# Patient Record
Sex: FEMALE | Race: WHITE | URBAN - METROPOLITAN AREA
[De-identification: names, ages, dates, MRNs, and addresses within clinical notes are randomized per-mention and may not be internally consistent; named-entity substitution may affect disease eponyms.]

---

## 2017-07-14 ENCOUNTER — OFFICE VISIT (OUTPATIENT)
Dept: FAMILY MEDICINE | Facility: CLINIC | Age: 22
End: 2017-07-14
Payer: COMMERCIAL

## 2017-07-14 VITALS
SYSTOLIC BLOOD PRESSURE: 124 MMHG | WEIGHT: 136 LBS | BODY MASS INDEX: 22.66 KG/M2 | DIASTOLIC BLOOD PRESSURE: 74 MMHG | HEIGHT: 65 IN | HEART RATE: 80 BPM

## 2017-07-14 DIAGNOSIS — Z11.3 ROUTINE SCREENING FOR STI (SEXUALLY TRANSMITTED INFECTION): ICD-10-CM

## 2017-07-14 DIAGNOSIS — B36.0 TINEA VERSICOLOR: Primary | ICD-10-CM

## 2017-07-14 PROCEDURE — 87491 CHLMYD TRACH DNA AMP PROBE: CPT | Performed by: NURSE PRACTITIONER

## 2017-07-14 PROCEDURE — 99213 OFFICE O/P EST LOW 20 MIN: CPT | Performed by: NURSE PRACTITIONER

## 2017-07-14 RX ORDER — KETOCONAZOLE 20 MG/ML
SHAMPOO TOPICAL
Qty: 120 ML | Refills: 1 | Status: SHIPPED | OUTPATIENT
Start: 2017-07-14 | End: 2018-03-05

## 2017-07-14 NOTE — MR AVS SNAPSHOT
After Visit Summary   7/14/2017    Romeo Funez    MRN: [id number]           Patient Information     Date Of Birth          1995        Visit Information        Provider Department      7/14/2017 8:40 AM Linh Knowles APRN [facility]        Today's Diagnoses     Routine screening for STI (sexually transmitted infection)    -  1    Tinea versicolor          Care Instructions      Nizoral shampoo sent to the pharmacy-apply and wash off after 5 minutes  Follow up if symptoms do not improve or worsen.    Tinea Versicolor  This is a rash caused by a fungus in the top layers of the skin. This fungus is normally present in the pores of the skin and causes no symptoms. But when the fungus overgrows it causes a rash. The fungus grows more easily in hot climates, and on oily or sweaty skin. Health experts don t know why some people get this rash and others don t. Experts also don t know why the rash will suddenly appear in someone who has never had it before.  The rash is made up of irregular pale or tan spots and patches. The rash is usually on the neck, upper back, chest, and shoulders. You may have mild itching, especially if you become overheated. But it doesn't cause other symptoms. Because these spots don't change color with sun exposure like normal skin, the rash may be lighter or darker than your normal skin.  This rash is harmless and usually causes no symptoms. The only reason for treatment is to improve appearance. Follow the advice below to clear the rash. It might take several months for normal skin color to return.  Home care    Use a medicated dandruff shampoo over your whole body while in the shower. Don t use soap. Let the shampoo stay on for at least a few minutes before rinsing off. Do this every day for 4 weeks.    As a different treatment, you may buy an antifungal cream (miconazole or clotrimazole, both available without a prescription). Use this  2 times a day for 7 days.     This rash is not contagious to others. It can t be spread if someone touches it. So you don t have to worry about exposing others at school, , or work.  Prevention  This fungus can come back again (recur) after treatment. To prevent return of the rash, use medicated dandruff shampoo over your whole body when in the shower. Do this once a month for the next year. This is very important to do in the summertime. That is when the rash is most likely to recur.  Other prevention tips include:    Avoid oily skin products    Wear loose clothing. Try to let your skin stay cool and breathe.    Use sunscreen and protect yourself from sunlight    Avoid tanning beds  Follow-up care  Follow up with your healthcare provider, or as advised. Call your provider if the rash doesn t get better with the above treatment, or if new symptoms appear.  When to seek medical advice  Call your healthcare provider right away if any of these occur:    Increasing redness of the rash    Change in appearance of the rash    Fever of 100.4 F (38 C) or higher, or as directed by your provider  Date Last Reviewed: 8/1/2016 2000-2017 [employer]. [address] [place] [address]. All rights reserved. This information is not intended as a substitute for professional medical care. Always follow your healthcare professional's instructions.                Follow-ups after your visit        Who to contact     If you have questions or need follow up information about today's clinic visit or your schedule please contact [facility] directly at [phone].  Normal or non-critical lab and imaging results will be communicated to you by MyChart, letter or phone within 4 business days after the clinic has received the results. If you do not hear from us within 7 days, please contact the clinic through MyChart or phone. If you have a critical or abnormal lab result, we will notify you  "by phone as soon as possible.  Submit refill requests through [employer] or call your pharmacy and they will forward the refill request to us. Please allow 3 business days for your refill to be completed.          Additional Information About Your Visit        [email or web address] Information     [employer] gives you secure access to your electronic health record. If you see a primary care provider, you can also send messages to your care team and make appointments. If you have questions, please call your primary care clinic.  If you do not have a primary care provider, please call [phone] and they will assist you.        Care EveryWhere ID     This is your Care EveryWhere ID. This could be used by other organizations to access your [facility] medical records  [id number]        Your Vitals Were     Pulse Height BMI (Body Mass Index)             80 5' 4.75\" (1.645 m) 22.81 kg/m2          Blood Pressure from Last 3 Encounters:   07/14/17 124/74   05/26/16 128/75   12/25/15 141/77    Weight from Last 3 Encounters:   07/14/17 136 lb (61.7 kg)   05/26/16 151 lb (68.5 kg)   12/25/15 135 lb (61.2 kg)              We Performed the Following     Chlamydia trachomatis PCR          Today's Medication Changes          These changes are accurate as of: 7/14/17  9:08 AM.  If you have any questions, ask your nurse or doctor.               Start taking these medicines.        Dose/Directions    ketoconazole 2 % shampoo   Commonly known as:  NIZORAL   Used for:  Tinea versicolor   Started by:  Linh Knowles APRN CNP        Apply to the affected area and wash off after 5 minutes.   Quantity:  120 mL   Refills:  1            Where to get your medicines      These medications were sent to [facility] [address] [place] [address]     Phone:  [phone]     ketoconazole 2 % shampoo                Primary Care Provider Office Phone # Fax #    Deepthi Coyne MD " [phone]       [facility] [address] [facility] [place] [address]        Equal Access to Services     RAYRAY JOHNSON : Hadii aad ku hadnylabrittney Raphael, lissahermes erazoblessingha, soto baileykobe ferrer, aletha davis laelizalobito felder. So [facility] [phone].    ATENCIÓN: Si habla español, tiene a rodgers disposición servicios gratuitos de asistencia lingüística. Llame al [phone].    We comply with applicable federal civil rights laws and [place] laws. We do not discriminate on the basis of race, color, national origin, age, disability sex, sexual orientation or gender identity.            Thank you!     Thank you for choosing [facility]  for your care. Our goal is always to provide you with excellent care. Hearing back from our patients is one way we can continue to improve our services. Please take a few minutes to complete the written survey that you may receive in the mail after your visit with us. Thank you!             Your Updated Medication List - Protect others around you: Learn how to safely use, store and throw away your medicines at www.disposemymeds.org.          This list is accurate as of: 7/14/17  9:08 AM.  Always use your most recent med list.                   Brand Name Dispense Instructions for use Diagnosis    ketoconazole 2 % shampoo    NIZORAL    120 mL    Apply to the affected area and wash off after 5 minutes.    Tinea versicolor

## 2017-07-14 NOTE — NURSING NOTE
"Chief Complaint   Patient presents with     Derm Problem       Initial /74 (Cuff Size: Adult Regular)  Pulse 80  Ht 5' 4.75\" (1.645 m)  Wt 136 lb (61.7 kg)  BMI 22.81 kg/m2 Estimated body mass index is 22.81 kg/(m^2) as calculated from the following:    Height as of this encounter: 5' 4.75\" (1.645 m).    Weight as of this encounter: 136 lb (61.7 kg).  Medication Reconciliation: complete    Health Maintenance that is potentially due pending provider review:  Chlamydia screening     Kori Olmos, CMA        "

## 2017-07-14 NOTE — PROGRESS NOTES
SUBJECTIVE:                                                    Romeo Funez is a 21 year old female who presents to clinic today for the following health issues:      Rash  Onset: last year     Description:   Location: neck and back   Character: red  Itching (Pruritis): YES    Progression of Symptoms:  Same - not improving     Accompanying Signs & Symptoms:  Fever: no   Body aches or joint pain: no   Sore throat symptoms: no   Recent cold symptoms: no     History:   Previous similar rash: no     Precipitating factors:   Exposure to similar rash: no   New exposures: Cats   Recent travel: no     Alleviating factors:  none    Therapies Tried and outcome: using antifungal cream - helps with itch    Antifungal cream helping  Gets areas on check, upper back, neck and abdomen.  Noticed last summer.  Itching brown spots.    Problem list and histories reviewed & adjusted, as indicated.  Additional history: as documented    There is no problem list on file for this patient.    Past Surgical History:   Procedure Laterality Date     SURGICAL HISTORY OF -       tonsils and adenoids     SURGICAL HISTORY OF -       PE tubes       Social History   Substance Use Topics     Smoking status: Never Smoker     Smokeless tobacco: Never Used     Alcohol use No     Family History   Problem Relation Age of Onset     DIABETES Paternal Grandmother      HEART DISEASE Paternal Grandmother      CHF     Lipids Paternal Grandmother      HEART DISEASE Paternal Grandfather      CANCER Maternal Grandmother      skin     Asthma Maternal Grandmother          Current Outpatient Prescriptions   Medication Sig Dispense Refill     ketoconazole (NIZORAL) 2 % shampoo Apply to the affected area and wash off after 5 minutes. 120 mL 1     Allergies   Allergen Reactions     Sulfa Drugs Hives            Labs reviewed in EPIC    Reviewed and updated as needed this visit by clinical staff       Reviewed and updated as needed this visit by Provider      "    ROS:  Constitutional, HEENT, cardiovascular, pulmonary, gi and gu systems are negative, except as otherwise noted.    OBJECTIVE:     /74 (Cuff Size: Adult Regular)  Pulse 80  Ht 5' 4.75\" (1.645 m)  Wt 136 lb (61.7 kg)  BMI 22.81 kg/m2  Body mass index is 22.81 kg/(m^2).  GENERAL: healthy, alert and no distress  SKIN: hyperpigmentation - right neck and abdomen  PSYCH: mentation appears normal, affect normal/bright    Diagnostic Test Results:  none     ASSESSMENT/PLAN:     1. Tinea versicolor  Consistent with tinea versicolor.  Nizoral shampoo sent to the pharmacy. Symptomatic care and follow up discussed.  - ketoconazole (NIZORAL) 2 % shampoo; Apply to the affected area and wash off after 5 minutes.  Dispense: 120 mL; Refill: 1    2. Routine screening for STI (sexually transmitted infection)    - Chlamydia trachomatis PCR    Home care instructions were reviewed with the patient. The risks, benefits and treatment options of prescribed medications or other treatments have been discussed with the patient. The patient verbalized their understanding and should call or follow up if no improvement or if they develop further problems.      Patient Instructions     Nizoral shampoo sent to the pharmacy-apply and wash off after 5 minutes  Follow up if symptoms do not improve or worsen.    Tinea Versicolor  This is a rash caused by a fungus in the top layers of the skin. This fungus is normally present in the pores of the skin and causes no symptoms. But when the fungus overgrows it causes a rash. The fungus grows more easily in hot climates, and on oily or sweaty skin. Health experts don t know why some people get this rash and others don t. Experts also don t know why the rash will suddenly appear in someone who has never had it before.  The rash is made up of irregular pale or tan spots and patches. The rash is usually on the neck, upper back, chest, and shoulders. You may have mild itching, especially if you " become overheated. But it doesn't cause other symptoms. Because these spots don't change color with sun exposure like normal skin, the rash may be lighter or darker than your normal skin.  This rash is harmless and usually causes no symptoms. The only reason for treatment is to improve appearance. Follow the advice below to clear the rash. It might take several months for normal skin color to return.  Home care    Use a medicated dandruff shampoo over your whole body while in the shower. Don t use soap. Let the shampoo stay on for at least a few minutes before rinsing off. Do this every day for 4 weeks.    As a different treatment, you may buy an antifungal cream (miconazole or clotrimazole, both available without a prescription). Use this 2 times a day for 7 days.     This rash is not contagious to others. It can t be spread if someone touches it. So you don t have to worry about exposing others at school, , or work.  Prevention  This fungus can come back again (recur) after treatment. To prevent return of the rash, use medicated dandruff shampoo over your whole body when in the shower. Do this once a month for the next year. This is very important to do in the summertime. That is when the rash is most likely to recur.  Other prevention tips include:    Avoid oily skin products    Wear loose clothing. Try to let your skin stay cool and breathe.    Use sunscreen and protect yourself from sunlight    Avoid tanning beds  Follow-up care  Follow up with your healthcare provider, or as advised. Call your provider if the rash doesn t get better with the above treatment, or if new symptoms appear.  When to seek medical advice  Call your healthcare provider right away if any of these occur:    Increasing redness of the rash    Change in appearance of the rash    Fever of 100.4 F (38 C) or higher, or as directed by your provider  Date Last Reviewed: 8/1/2016 2000-2017 [employer]. [address] [place] [address]  Road, [place] [address]. All rights reserved. This information is not intended as a substitute for professional medical care. Always follow your healthcare professional's instructions.            LORI [facility]

## 2017-07-14 NOTE — PATIENT INSTRUCTIONS
Nizoral shampoo sent to the pharmacy-apply and wash off after 5 minutes  Follow up if symptoms do not improve or worsen.    Tinea Versicolor  This is a rash caused by a fungus in the top layers of the skin. This fungus is normally present in the pores of the skin and causes no symptoms. But when the fungus overgrows it causes a rash. The fungus grows more easily in hot climates, and on oily or sweaty skin. Health experts don t know why some people get this rash and others don t. Experts also don t know why the rash will suddenly appear in someone who has never had it before.  The rash is made up of irregular pale or tan spots and patches. The rash is usually on the neck, upper back, chest, and shoulders. You may have mild itching, especially if you become overheated. But it doesn't cause other symptoms. Because these spots don't change color with sun exposure like normal skin, the rash may be lighter or darker than your normal skin.  This rash is harmless and usually causes no symptoms. The only reason for treatment is to improve appearance. Follow the advice below to clear the rash. It might take several months for normal skin color to return.  Home care    Use a medicated dandruff shampoo over your whole body while in the shower. Don t use soap. Let the shampoo stay on for at least a few minutes before rinsing off. Do this every day for 4 weeks.    As a different treatment, you may buy an antifungal cream (miconazole or clotrimazole, both available without a prescription). Use this 2 times a day for 7 days.     This rash is not contagious to others. It can t be spread if someone touches it. So you don t have to worry about exposing others at school, , or work.  Prevention  This fungus can come back again (recur) after treatment. To prevent return of the rash, use medicated dandruff shampoo over your whole body when in the shower. Do this once a month for the next year. This is very important to do in the  summertime. That is when the rash is most likely to recur.  Other prevention tips include:    Avoid oily skin products    Wear loose clothing. Try to let your skin stay cool and breathe.    Use sunscreen and protect yourself from sunlight    Avoid tanning beds  Follow-up care  Follow up with your healthcare provider, or as advised. Call your provider if the rash doesn t get better with the above treatment, or if new symptoms appear.  When to seek medical advice  Call your healthcare provider right away if any of these occur:    Increasing redness of the rash    Change in appearance of the rash    Fever of 100.4 F (38 C) or higher, or as directed by your provider  Date Last Reviewed: 8/1/2016 2000-2017 [employer]. [address] [place] [address], [place] [address]. All rights reserved. This information is not intended as a substitute for professional medical care. Always follow your healthcare professional's instructions.

## 2017-07-16 LAB
C TRACH DNA SPEC QL NAA+PROBE: NORMAL
SPECIMEN SOURCE: NORMAL

## 2017-11-24 ENCOUNTER — PRENATAL OFFICE VISIT - GICH (OUTPATIENT)
Dept: OBGYN | Facility: OTHER | Age: 22
End: 2017-11-24

## 2017-11-24 ENCOUNTER — HISTORY (OUTPATIENT)
Dept: OBGYN | Facility: OTHER | Age: 22
End: 2017-11-24

## 2017-11-24 DIAGNOSIS — Z34.01 ENCOUNTER FOR SUPERVISION OF NORMAL FIRST PREGNANCY IN FIRST TRIMESTER: ICD-10-CM

## 2017-11-24 LAB
ABORH - HISTORICAL: NORMAL
ABSOLUTE BASOPHILS - HISTORICAL: 0.1 THOU/CU MM
ABSOLUTE EOSINOPHILS - HISTORICAL: 0.1 THOU/CU MM
ABSOLUTE IMMATURE GRANULOCYTES(METAS,MYELOS,PROS) - HISTORICAL: 0 THOU/CU MM
ABSOLUTE LYMPHOCYTES - HISTORICAL: 1.7 THOU/CU MM (ref 0.9–2.9)
ABSOLUTE MONOCYTES - HISTORICAL: 0.4 THOU/CU MM
ABSOLUTE NEUTROPHILS - HISTORICAL: 6.3 THOU/CU MM (ref 1.7–7)
ANTIBODY SCREEN - HISTORICAL: NEGATIVE
BASOPHILS # BLD AUTO: 0.7 %
EOSINOPHIL NFR BLD AUTO: 0.7 %
ERYTHROCYTE [DISTWIDTH] IN BLOOD BY AUTOMATED COUNT: 11.9 % (ref 11.5–15.5)
HBSAG CATEGORY - HISTORICAL: NONREACTIVE
HCT VFR BLD AUTO: 39.5 % (ref 33–51)
HEMOGLOBIN: 13.8 G/DL (ref 12–16)
HIV-1/HIV-2 ANTIBODY CATEGORY - HISTORICAL: NORMAL
IMMATURE GRANULOCYTES(METAS,MYELOS,PROS) - HISTORICAL: 0.4 %
LYMPHOCYTES NFR BLD AUTO: 20 % (ref 20–44)
MCH RBC QN AUTO: 30.1 PG (ref 26–34)
MCHC RBC AUTO-ENTMCNC: 34.9 G/DL (ref 32–36)
MCV RBC AUTO: 86 FL (ref 80–100)
MONOCYTES NFR BLD AUTO: 4.8 %
NEUTROPHILS NFR BLD AUTO: 73.4 % (ref 42–72)
PLATELET # BLD AUTO: 176 THOU/CU MM (ref 140–440)
PMV BLD: 11.4 FL (ref 6.5–11)
RED BLOOD COUNT - HISTORICAL: 4.58 MIL/CU MM (ref 4–5.2)
RUBELLA COMMENT - HISTORICAL: NORMAL
SPECIMEN EXPIRATION DATE/TIME - HISTORICAL: NORMAL
WHITE BLOOD COUNT - HISTORICAL: 8.5 THOU/CU MM (ref 4.5–11)

## 2017-11-25 LAB — TREPONEMA PALLIDUM - HISTORICAL: NEGATIVE

## 2017-11-29 ENCOUNTER — AMBULATORY - GICH (OUTPATIENT)
Dept: OBGYN | Facility: OTHER | Age: 22
End: 2017-11-29

## 2017-11-29 ENCOUNTER — HOSPITAL ENCOUNTER (OUTPATIENT)
Dept: RADIOLOGY | Facility: OTHER | Age: 22
End: 2017-11-29

## 2017-11-29 DIAGNOSIS — Z34.01 ENCOUNTER FOR SUPERVISION OF NORMAL FIRST PREGNANCY IN FIRST TRIMESTER: ICD-10-CM

## 2017-12-01 LAB
CF INTERPRETATION - HISTORICAL: NORMAL
CF RESULT - HISTORICAL: NORMAL

## 2017-12-05 ENCOUNTER — AMBULATORY - GICH (OUTPATIENT)
Dept: LAB | Facility: OTHER | Age: 22
End: 2017-12-05

## 2017-12-05 DIAGNOSIS — Z34.01 ENCOUNTER FOR SUPERVISION OF NORMAL FIRST PREGNANCY IN FIRST TRIMESTER: ICD-10-CM

## 2017-12-05 LAB — SPINAL MUSCLE ATROPHY CAR - HISTORICAL: NORMAL

## 2017-12-22 ENCOUNTER — PRENATAL OFFICE VISIT - GICH (OUTPATIENT)
Dept: OBGYN | Facility: OTHER | Age: 22
End: 2017-12-22

## 2017-12-22 ENCOUNTER — HISTORY (OUTPATIENT)
Dept: OBGYN | Facility: OTHER | Age: 22
End: 2017-12-22

## 2017-12-22 DIAGNOSIS — Z34.01 ENCOUNTER FOR SUPERVISION OF NORMAL FIRST PREGNANCY IN FIRST TRIMESTER: ICD-10-CM

## 2017-12-28 NOTE — PROGRESS NOTES
Patient Information     Patient Name MRN Sex Romeo Gutierrez [id number] Female 1995      Progress Notes by Jana Hart R.T. (ARRT) at 2017  6:31 PM     Author:  Jana Hart R.T. (ARRT) Service:  (none) Author Type:  [employer] - Registered Radiologic Technologist     Filed:  2017  6:31 PM Date of Service:  2017  6:31 PM Status:  Signed     :  Jana Hart R.T. (ARRT) ([employer] - Registered Radiologic Technologist)            Falls Risk Criteria:    Age 65 and older or under age 4        Sensory deficits    Poor vision    Use of ambulatory aides    Impaired judgment    Unable to walk independently    Meets High Risk criteria for falls:  no

## 2017-12-28 NOTE — PROGRESS NOTES
Patient Information     Patient Name MRN Sex Romeo Gutierrez [id number] Female 1995      Progress Notes by Linh Guadalupe MD at 2017  9:30 AM     Author:  Linh Guadalupe MD Service:  (none) Author Type:  Physician     Filed:  2017 11:22 AM Encounter Date:  2017 Status:  Signed     :  Linh Guadalupe MD (Physician)            INITIAL OB VISIT    HPI  Romeo Funez is a 22 y.o. female  at approximately 7 weeks by LMP who presents for first OB visit. This pregnancy was unplanned, becoming welcome. Just started dating her boyfriend, Darvin, in 2017. She had her last normal menstrual period 17. Had a 3-4 day lighter menses in early October but doesn't remember the exact dates. They were not using contraception. She had mostly regular menses.    SYMPTOMS SINCE LMP  Fatigue: No  Nausea: Yes  Emesis: No but is having dryheaving  Bleeding: No  Breast tenderness: Yes    MENSTRUAL HISTORY  LMP:Patient's last menstrual period was 10/07/2017 (approximate).  Definite:  No  Menses monthly:  Yes  On contraception at conception:  No  HCG positive date: 17  Last pap 2016 at [facility]    PAST PREGNANCIES  G1    PAST MEDICAL/SURGICAL HISTORY  PMH: Denies medical problems  PSH: tonsillectomy    Current Outpatient Prescriptions       Medication  Sig Dispense Refill     Prenatal Multivit-Ca-Min-Fe-FA (PRENATAL VITAMIN) tab tablet Take 1 tablet by mouth once daily.  0     No current facility-administered medications for this visit.      Medications have been reviewed by me and are current to the best of my knowledge and ability.    Allergies: Sulfasalazine    SOCIAL HISTORY  Tobacco:  Yes- quit with +UPT  Alcohol:  Yes- once weekly, quit with +UPT  Drugs:  No  Single, now lives with her boyfriend Darvin, sister, and another roommate  Going to school for education at [employer], in her last semester. Works at [employer]    FamH:  "brother- cleft palate, niece from a different brother- cleft palate. No history of cleft lip or other children born with birth defects    GENETIC SCREENING:  Maternal pertinent postives: Yes  Paternal pertinent positives: No    INFECTION HISTORY  Patient or partner with hx HSV:No  Rash or viral illness since LMP:No  Hepatitis B or C: No  STD (GC, Chlamydia, HPV):No    ROS: see HPI, complete ROS otherwise negative    PHYSICAL EXAM  /62  Pulse 84  Ht 1.651 m (5' 5\")  Wt 64.9 kg (143 lb)  LMP 10/07/2017 (Approximate)  BMI 23.8 kg/m2   General: Pleasant WN female, A and O x3, NAD  HEENT : Grossly normal  Neck: Thyroid normal size, with no nodules, no adenopathy  Lungs: Clear, good AE, no rales or rhonchi  Heart: RRR no murmur , NL S1 and S2  Abdomen: Soft NT, ND, no masses, normal BS  Ext: No edema    Pelvic:  EGBUS Normal  Cervix Normal  Uterus nontender, approximately 7 week size  Adnexa, neg for tenderness or mass  Cx closed /Long / High    FHT: early gestation    IMPRESSION   IUP at approximately 7 weeks by LMP    Risk factors identified: family history of cleft palate- needs targeted anatomy US  High risk pregnancy: No  Repeat C/S planned: No      PLAN:  Discussed genetic testing available: Yes- desires, CF and SMA drawn today. Quad screen at 16-18 weeks  1st trimester US ordered/completed: Yes- ordered. Will date based on this result with uncertain LMP  Anesthesia consult needed: No  OB/Gyn or MFM referral: No    GC/Chlam screening,  routine labs ordered  Prenatal vitamin daily  Routine 1st trimester anticipatory guidance  Return to office in four weeks for follow up or sooner prn.  Questions answered.    Linh Guadalupe MD  OB/GYN  2017 11:11 AM            "

## 2018-01-02 ENCOUNTER — COMMUNICATION - GICH (OUTPATIENT)
Dept: OBGYN | Facility: OTHER | Age: 23
End: 2018-01-02

## 2018-01-25 VITALS
BODY MASS INDEX: 23.82 KG/M2 | DIASTOLIC BLOOD PRESSURE: 62 MMHG | WEIGHT: 143 LBS | SYSTOLIC BLOOD PRESSURE: 120 MMHG | HEIGHT: 65 IN | HEART RATE: 84 BPM

## 2018-02-06 ENCOUNTER — DOCUMENTATION ONLY (OUTPATIENT)
Dept: FAMILY MEDICINE | Facility: OTHER | Age: 23
End: 2018-02-06

## 2018-02-08 ENCOUNTER — PRENATAL OFFICE VISIT - GICH (OUTPATIENT)
Dept: OBGYN | Facility: OTHER | Age: 23
End: 2018-02-08

## 2018-02-08 ENCOUNTER — HISTORY (OUTPATIENT)
Dept: OBGYN | Facility: OTHER | Age: 23
End: 2018-02-08

## 2018-02-08 DIAGNOSIS — Z34.02 ENCOUNTER FOR SUPERVISION OF NORMAL FIRST PREGNANCY IN SECOND TRIMESTER: ICD-10-CM

## 2018-02-08 DIAGNOSIS — K59.00 CONSTIPATION: ICD-10-CM

## 2018-02-08 LAB — GENZYME QUAD SCREEN - HISTORICAL: NORMAL

## 2018-02-09 VITALS
SYSTOLIC BLOOD PRESSURE: 124 MMHG | BODY MASS INDEX: 23.96 KG/M2 | WEIGHT: 144 LBS | HEART RATE: 92 BPM | DIASTOLIC BLOOD PRESSURE: 68 MMHG

## 2018-02-09 VITALS
BODY MASS INDEX: 24.53 KG/M2 | SYSTOLIC BLOOD PRESSURE: 118 MMHG | DIASTOLIC BLOOD PRESSURE: 70 MMHG | HEART RATE: 86 BPM | WEIGHT: 147.4 LBS

## 2018-02-12 NOTE — TELEPHONE ENCOUNTER
"Patient Information     Patient Name MRN Romeo Rizo [id number] Female 1995      Telephone Encounter by Ankita Snowden RN at 2018  4:07 PM     Author:  Ankita Snowden RN Service:  (none) Author Type:  NURS- Registered Nurse     Filed:  2018  4:25 PM Encounter Date:  2018 Status:  Signed     :  Ankita Snowden RN (NURS- Registered Nurse)            Patient calls today reporting Nausea and headaches. She was in the [facility] ER on 17 for these symptoms. She was concerned about exposure to strep. She has post-nasal drip and sinus pain as well. She has tried taking Tylenol, but it does not help.  She has been able to keep down very little food. Tolerates water.  Has prescription for Ondansetron from ER, which she has not picked up yet. She did take one in the ER, and thought it may have helped some. She was advised to come in tomorrow afternoon for further evaluation of long-lasting headache. Patient declined appointment. She would like to try the Ondansetron to see if it would help her to keep some food down in hopes that would help the headache. She was advised to be seen if headache is not better by tomorrow.    Reason for Disposition    [1] MODERATE headache (e.g., interferes with normal activities) AND [2] present > 24 hours AND [3] unexplained  (Exceptions: analgesics not tried, typical migraine, or headache part of viral illness)    Answer Assessment - Initial Assessment Questions  1. LOCATION: \"Where does it hurt?\"       Whole head  2. ONSET: \"When did the headache start?\" (Minutes, hours or days)       2 days ago  3. PATTERN: \"Does the pain come and go, or has it been constant since it started?\"      Constant, but fluctuates in severity  4. SEVERITY: \"How bad is the pain?\" and \"What does it keep you from doing?\"     - MILD - doesn't interfere with normal activities     - MODERATE - interferes with normal activities or awakens from sleep     - " "SEVERE - excruciating pain, unable to do any normal activities         7/10  5. RECURRENT SYMPTOM: \"Have you ever had headaches before?\" If so, ask: \"When was the last time?\" and \"What happened that time?\"       no  6. CAUSE: \"What do you think is causing the headache?\"      unknown  7. MIGRAINE: \"Have you been diagnosed with migraine headaches?\" If so, ask: \"Is this headache similar?\"       no  8. HEAD INJURY: \"Has there been any recent injury to the head?\"       no  9. OTHER SYMPTOMS: \"Do you have any other symptoms?\" (e.g., fever, stiff neck, blurred vision; swelling of hands, face, or feet)      Nausea  10. PREGNANCY: \"How many weeks pregnant are you?\"        Yes 12w4d  11. CAMILA: \"What date are you expecting to deliver?\"        7/13/18    Protocols used: ADULT PREGNANCY - HEADACHE-A-AH            "

## 2018-02-12 NOTE — PROGRESS NOTES
Patient Information     Patient Name MRN Sex Romeo Gutierrez [id number] Female 1995      Progress Notes by Linh Guadalupe MD at 2017  9:30 AM     Author:  Linh Guadalupe MD Service:  (none) Author Type:  Physician     Filed:  2017  9:59 AM Encounter Date:  2017 Status:  Signed     :  Linh Guadalupe MD (Physician)            [facility]  Return OB Visit    S: Patient reports she has been feeling okay. Still nauseated intermittently. Has dry heaving in the AM but better throughout the day. Has tried crackers in the morning, which doesn't really seem to help. She denies cramping, vaginal bleeding. Also reports significant fatigue, sleeping a lot.    O: /70 (Cuff Site: Right Arm, Position: Sitting, Cuff Size: Adult Regular)  Pulse 86  Wt 66.9 kg (147 lb 6.4 oz)  LMP 10/07/2017 (Approximate)  BMI 24.53 kg/m2  Gen: Well-appearing, NAD    FHR: 170    A/P:  Romeo Funez is a 22 y.o.  at 11w0d by 7w5d US, here for return OB visit.  Family history of cleft palate: needs Level II anatomy US    PNC:   - Prenatal labs reviewed, Rh positive, Rubella immune  - Genetics: negative SMA and CF screen. Quad at 16-18 weeks  - Imaging: dating US at 7w5d.   - Immunizations: influenza 2017   RTC 4 weeks    Linh Guadalupe MD  OB/GYN  2017 9:43 AM

## 2018-02-12 NOTE — NURSING NOTE
Patient Information     Patient Name MRN Sex Romeo Gutierrez [id number] Female 1995      Nursing Note by Lisa Griggs at 2017  9:30 AM     Author:  Lisa Griggs Service:  (none) Author Type:  (none)     Filed:  2017  9:59 AM Encounter Date:  2017 Status:  Signed     :  Lisa Griggs            Pt presents for prenatal care.  Lisa Griggs

## 2018-02-13 NOTE — NURSING NOTE
Patient Information     Patient Name MRN Romeo Rizo [id number] Female 1995      Nursing Note by Katya Rangel at 2018  9:30 AM     Author:  Katya Rangel Service:  (none) Author Type:  (none)     Filed:  2018  9:37 AM Encounter Date:  2018 Status:  Signed     :  Katya Rangel            2 Babystep coupons given.  Katya Rangel LPN  2018  9:30 AM

## 2018-02-13 NOTE — PROGRESS NOTES
Patient Information     Patient Name MRN Sex Romeo Gutierrez [id number] Female 1995      Progress Notes by Linh Guadalupe MD at 2018  9:30 AM     Author:  Linh Guadalupe MD Service:  (none) Author Type:  Physician     Filed:  2018  9:59 AM Encounter Date:  2018 Status:  Signed     :  Linh Guadalupe MD (Physician)            [facility]  Return OB Visit    S: Patient reports she has been feeling well. Some RLP. Also struggling with constipation. Has nausea a few days per week in the morning still. She denies cramping, vaginal bleeding, leaking fluid. No FM yet.     O: /68 (Cuff Site: Right Arm, Position: Sitting, Cuff Size: Adult Regular)  Pulse 92  Wt 65.3 kg (144 lb)  LMP 10/07/2017 (Approximate)  BMI 23.96 kg/m2  Gen: Well-appearing, NAD    Fundal Height:  U-3  FHR: 140    A/P:  Romeo Funez is a 22 y.o.  at 17w6d by 7w5d US, here for return OB visit.  Family history of cleft palate: needs Level II anatomy US- ord'd  Constipation: miralax     PNC:   - Prenatal labs reviewed, Rh positive, Rubella immune  - Genetics: negative SMA and CF screen. Quad 2018   - Imaging: dating US at 7w5d.   - Immunizations: influenza 2017   RTC 4 weeks       Linh Guadalupe MD  OB/GYN  2018 9:33 AM

## 2018-02-20 ENCOUNTER — TELEPHONE (OUTPATIENT)
Dept: OBGYN | Facility: OTHER | Age: 23
End: 2018-02-20

## 2018-02-20 NOTE — TELEPHONE ENCOUNTER
"Patient recently had an ultrasound completed \"a couple weeks ago\". Was told to schedule an appointment for a Level II ultrasound - referral was placed 2/8/2018. Patient has not heard back yet if and when she will be seen. Patient was given telephone number for [facility], will call and check on status of referral.  Gregoria Cain RN............. 2/20/2018 11:13 AM   "

## 2018-02-26 ENCOUNTER — PRE VISIT (OUTPATIENT)
Dept: MATERNAL FETAL MEDICINE | Facility: CLINIC | Age: 23
End: 2018-02-26

## 2018-02-27 ENCOUNTER — TRANSFERRED RECORDS (OUTPATIENT)
Dept: HEALTH INFORMATION MANAGEMENT | Facility: OTHER | Age: 23
End: 2018-02-27

## 2018-02-27 ENCOUNTER — HOSPITAL ENCOUNTER (OUTPATIENT)
Dept: ULTRASOUND IMAGING | Facility: CLINIC | Age: 23
End: 2018-02-27
Attending: OBSTETRICS & GYNECOLOGY
Payer: COMMERCIAL

## 2018-02-27 ENCOUNTER — HOSPITAL ENCOUNTER (OUTPATIENT)
Dept: ULTRASOUND IMAGING | Facility: HOSPITAL | Age: 23
Discharge: HOME | End: 2018-02-27
Attending: OBSTETRICS & GYNECOLOGY | Admitting: OBSTETRICS & GYNECOLOGY
Payer: COMMERCIAL

## 2018-02-27 DIAGNOSIS — Z82.79 FAMILY HISTORY OF CLEFT PALATE: ICD-10-CM

## 2018-02-27 DIAGNOSIS — Z34.02 ENCOUNTER FOR SUPERVISION OF NORMAL FIRST PREGNANCY IN SECOND TRIMESTER: ICD-10-CM

## 2018-02-27 PROCEDURE — 76811 OB US DETAILED SNGL FETUS: CPT | Mod: TC

## 2018-03-05 RX ORDER — POLYETHYLENE GLYCOL 3350 17 G/17G
17 POWDER, FOR SOLUTION ORAL
COMMUNITY
Start: 2018-02-08 | End: 2018-09-04

## 2018-03-16 ENCOUNTER — TELEPHONE (OUTPATIENT)
Dept: OBGYN | Facility: OTHER | Age: 23
End: 2018-03-16

## 2018-03-16 NOTE — TELEPHONE ENCOUNTER
Patient called and is wondering if [facility] would be willing to give her a call back in regards to possibly working her in for a morning appointment sooner then 04/17/2018.  She stated that she also currently has the flu and is wondering if she could be advised as to what signs and symptoms to look for in regards to baby's health while she is sick.  Thank you!     Yesenia Muñiz on 3/16/2018 at 3:54 PM

## 2018-03-16 NOTE — TELEPHONE ENCOUNTER
Patient was offered an appointment with Dr Linh Guadalupe at 9:30 am on 3/29/2018. Patient is calling as she is vomiting, probable gastroenteritis. Patient advised to start with small sips of plain fluids, progress to fluids with electrolytes (Gatorade, Powerade, Propel, etc.) when able, after 8-12 hours of tolerating a free fluid diet patient is able to advance to the BRAT diet. Once tolerating the BRAT diet patient can advance to normal diet. Patient will present to ED for vertigo, lightheadedness, chapped lips, racing heart, or 24 hours without being able to keep any fluids down. Patient agrees with this plan, will call Monday for a clinic follow up if necessary. Nothing further needed.  Gregoria Cain RN............. 3/16/2018 4:15 PM

## 2018-03-29 ENCOUNTER — PRENATAL OFFICE VISIT (OUTPATIENT)
Dept: OBGYN | Facility: OTHER | Age: 23
End: 2018-03-29
Attending: OBSTETRICS & GYNECOLOGY
Payer: COMMERCIAL

## 2018-03-29 VITALS
SYSTOLIC BLOOD PRESSURE: 130 MMHG | WEIGHT: 150.6 LBS | DIASTOLIC BLOOD PRESSURE: 72 MMHG | BODY MASS INDEX: 25.06 KG/M2 | HEART RATE: 84 BPM

## 2018-03-29 DIAGNOSIS — Z34.02 ENCOUNTER FOR SUPERVISION OF NORMAL FIRST PREGNANCY IN SECOND TRIMESTER: Primary | ICD-10-CM

## 2018-03-29 LAB
ERYTHROCYTE [DISTWIDTH] IN BLOOD BY AUTOMATED COUNT: 12.7 % (ref 10–15)
GLUCOSE 1H P 50 G GLC PO SERPL-MCNC: 75 MG/DL (ref 60–129)
HCT VFR BLD AUTO: 34.6 % (ref 35–47)
HGB BLD-MCNC: 12.4 G/DL (ref 11.7–15.7)
MCH RBC QN AUTO: 31.6 PG (ref 26.5–33)
MCHC RBC AUTO-ENTMCNC: 35.8 G/DL (ref 31.5–36.5)
MCV RBC AUTO: 88 FL (ref 78–100)
PLATELET # BLD AUTO: 149 10E9/L (ref 150–450)
RBC # BLD AUTO: 3.93 10E12/L (ref 3.8–5.2)
WBC # BLD AUTO: 13.7 10E9/L (ref 4–11)

## 2018-03-29 PROCEDURE — 99207 ZZC OB VISIT-NO CHARGE - GICH ONLY: CPT | Performed by: OBSTETRICS & GYNECOLOGY

## 2018-03-29 PROCEDURE — 86780 TREPONEMA PALLIDUM: CPT | Performed by: OBSTETRICS & GYNECOLOGY

## 2018-03-29 PROCEDURE — 82950 GLUCOSE TEST: CPT | Performed by: OBSTETRICS & GYNECOLOGY

## 2018-03-29 PROCEDURE — 36415 COLL VENOUS BLD VENIPUNCTURE: CPT | Performed by: OBSTETRICS & GYNECOLOGY

## 2018-03-29 PROCEDURE — 85027 COMPLETE CBC AUTOMATED: CPT | Performed by: OBSTETRICS & GYNECOLOGY

## 2018-03-29 ASSESSMENT — PAIN SCALES - GENERAL: PAINLEVEL: NO PAIN (0)

## 2018-03-29 NOTE — NURSING NOTE
Patient presents for routine OB care. No concerns at this time.    Ankita Snowden RN...................3/29/2018 9:29 AM

## 2018-03-29 NOTE — PROGRESS NOTES
Return OB Visit    S: Patient has been feeling okay. Had a GI illness two weeks ago but better now. Had heart burn pre-pregnancy, getting worse. Wondering what she can take. Has occasional cramping, no regular pattern. No VB or LOF. +FM.    O: /72 (BP Location: Right arm, Patient Position: Sitting, Cuff Size: Adult Regular)  Pulse 84  Wt 68.3 kg (150 lb 9.6 oz)  LMP 10/07/2017  BMI 25.06 kg/m2  Gen: Well-appearing, NAD  See OB Flowsheet    A/P:  Romeo Funez is a 22 year old  at 24w6d by 7w5d US, here for return OB visit.  Family history of cleft palate: normal level II  Constipation: miralax  GERD: zantac      PNC:   - Prenatal labs reviewed, Rh positive, Rubella immune. GCT, syphilis, CBC 3/29/2018   - Genetics: negative SMA and CF screen. Quad normal  - Imaging: dating US at 7w5d. Normal anatomy  - Immunizations: influenza 2017   RTC 4 weeks- Tdap next visit    Linh Guadalupe MD  OB/GYN  3/29/2018 9:57 AM

## 2018-03-29 NOTE — MR AVS SNAPSHOT
After Visit Summary   3/29/2018    Romeo Funez    MRN: [id number]           Patient Information     Date Of Birth          1995        Visit Information        Provider Department      3/29/2018 9:30 AM Linh Guadalupe MD [facility]        Today's Diagnoses     Encounter for supervision of normal first pregnancy in second trimester    -  1       Follow-ups after your visit        Your next 10 appointments already scheduled     Apr 25, 2018  9:15 AM CDT   ESTABLISHED PRENATAL with Linh Guadalupe MD   [facility]    [address]  [place] [address]   [phone]            May 08, 2018  9:15 AM CDT   ESTABLISHED PRENATAL with Regulo Maldonado MD   [facility]    [address]  [place] [address]   [phone]            May 23, 2018  9:15 AM CDT   ESTABLISHED PRENATAL with Linh Guadalupe MD   [facility]    [address]  [place] [address]   [phone]            Jun 06, 2018  9:15 AM CDT   ESTABLISHED PRENATAL with Linh Guadalupe MD   [facility] ([facility])    [address]  [place] [address]   [phone]            Jun 13, 2018  9:15 AM CDT   ESTABLISHED PRENATAL with Linh Guadalupe MD   [facility] ([facility])    [address]  [place] [address]   [phone]            Jun 20, 2018  9:15 AM CDT   ESTABLISHED PRENATAL with Linh Guadalupe MD   [facility]    [address]  [place] [address]   [phone]            Jun 27, 2018  9:15 AM CDT   ESTABLISHED PRENATAL with Linh Guadalupe MD   [facility] ([facility])    [address]  [place] [address]   [phone]            Jul 03, 2018  9:15 AM CDT   ESTABLISHED PRENATAL with Linh Guadalupe MD   [facility] ([facility])    [address]  [place] [address]   [phone]            Jul 11, 2018  9:15 AM CDT   ESTABLISHED PRENATAL with Linh Guadalupe MD   [facility]    [address]  [place] [address]   [phone]            Jul 18, 2018  9:15 AM CDT   ESTABLISHED PRENATAL with Linh Guadalupe MD   [facility]    [address]  [place] [address]   [phone]              Who to contact     If you have questions or need follow up information about today's clinic visit or your schedule please contact [facility] directly at [phone].  Normal or non-critical lab and imaging results will be communicated to you by [employer], letter or phone within 4 business days after the clinic has received the results. If you do not hear from us within 7 days, please contact the clinic through [employer] or phone. If you have a critical or abnormal lab result, we will notify you by phone as soon as possible.  Submit refill requests through [employer] or call your pharmacy and they will forward the refill request to us. Please allow 3 business days for your refill to be completed.          Additional Information About Your Visit        [employer] Information     [employer] gives you secure access to your electronic health record. If you see a primary care provider, you can also send messages to your care team and make appointments. If you have questions, please call your primary care clinic.  If you do not have a primary care provider, please call [phone] and they will assist you.        Care EveryWhere ID     This is your Care EveryWhere  ID. This could be used by other organizations to access your [place] medical records  [id number]        Your Vitals Were     Pulse Last Period BMI (Body Mass Index)             84 10/07/2017 25.06 kg/m2          Blood Pressure from Last 3 Encounters:   03/29/18 130/72   02/08/18 124/68   12/22/17 118/70    Weight from Last 3 Encounters:   03/29/18 68.3 kg (150 lb 9.6 oz)   02/08/18 65.3 kg (144 lb)   12/22/17 66.9 kg (147 lb 6.4 oz)              We Performed the Following     Anti Treponema     CBC with platelets     Glucose tolerance, gest screen, 1 hour        Primary Care Provider Office Phone # Fax #    Linh Guadalupe [phone] [address] [facility] [address]        Equal Access to Services     [facility]: Hadii sj burris hadasho Soomaali, waaxda luqadaha, qaybta kaalmada adecrystalyahermes, aletha rios . So [facility] [phone].    ATENCIÓN: Si habla español, tiene a rodgers disposición servicios gratuitos de asistencia lingüística. Llame al [phone].    We comply with applicable federal civil rights laws and [place] laws. We do not discriminate on the basis of race, color, national origin, age, disability, sex, sexual orientation, or gender identity.            Thank you!     Thank you for choosing [facility] AND [facility]  for your care. Our goal is always to provide you with excellent care. Hearing back from our patients is one way we can continue to improve our services. Please take a few minutes to complete the written survey that you may receive in the mail after your visit with us. Thank you!             Your Updated Medication List - Protect others around you: Learn how to safely use, store and throw away your medicines at www.disposemymeds.org.          This list is accurate as of 3/29/18 10:35 AM.  Always use your most recent med list.                   Brand Name Dispense Instructions for use Diagnosis    [employer] PRENATAL 28-0.8 MG  Tabs      Take 1 tablet by mouth daily        polyethylene glycol powder    MIRALAX/GLYCOLAX     Take 17 g by mouth

## 2018-03-30 LAB — T PALLIDUM IGG+IGM SER QL: NEGATIVE

## 2018-04-25 ENCOUNTER — PRENATAL OFFICE VISIT (OUTPATIENT)
Dept: OBGYN | Facility: OTHER | Age: 23
End: 2018-04-25
Attending: OBSTETRICS & GYNECOLOGY
Payer: COMMERCIAL

## 2018-04-25 VITALS
WEIGHT: 156.25 LBS | DIASTOLIC BLOOD PRESSURE: 66 MMHG | HEART RATE: 104 BPM | BODY MASS INDEX: 26 KG/M2 | SYSTOLIC BLOOD PRESSURE: 118 MMHG

## 2018-04-25 DIAGNOSIS — Z34.03 ENCOUNTER FOR SUPERVISION OF NORMAL FIRST PREGNANCY IN THIRD TRIMESTER: Primary | ICD-10-CM

## 2018-04-25 PROCEDURE — 90471 IMMUNIZATION ADMIN: CPT | Performed by: OBSTETRICS & GYNECOLOGY

## 2018-04-25 PROCEDURE — 99207 ZZC OB VISIT-NO CHARGE - GICH ONLY: CPT | Mod: 25 | Performed by: OBSTETRICS & GYNECOLOGY

## 2018-04-25 PROCEDURE — 90715 TDAP VACCINE 7 YRS/> IM: CPT | Performed by: OBSTETRICS & GYNECOLOGY

## 2018-04-25 ASSESSMENT — PAIN SCALES - GENERAL: PAINLEVEL: NO PAIN (1)

## 2018-04-25 NOTE — PROGRESS NOTES
Return OB Visit    S: Patient reports she has been feeling well. Had some ?cramping or contractions a few weeks ago for about an hour, none since. No VB or LOF. +FM    O: /66 (BP Location: Right arm, Patient Position: Sitting, Cuff Size: Adult Large)  Pulse 104  Wt 70.9 kg (156 lb 4 oz)  LMP 10/07/2017  BMI 26 kg/m2  Gen: Well-appearing, NAD  See OB Flowsheet    A/P:  Romeo Funez is a 22 year old  at 28w5d by 7w5d US, here for return OB visit.  Family history of cleft palate: normal level II  Constipation: miralax  GERD: zantac      PNC:   - Prenatal labs reviewed, Rh positive, Rubella immune. GCT 75  - Genetics: negative SMA and CF screen. Quad normal  - Imaging: dating US at 7w5d. Normal anatomy  - Immunizations: influenza 2017. Tdap 2018   RTC 2 weeks    Linh Guadalupe MD  OB/GYN  2018 9:29 AM

## 2018-04-25 NOTE — MR AVS SNAPSHOT
After Visit Summary   4/25/2018    Romeo Funez    MRN: [id number]           Patient Information     Date Of Birth          1995        Visit Information        Provider Department      4/25/2018 9:15 AM Linh Guadalupe MD [facility]        Today's Diagnoses     Encounter for supervision of normal first pregnancy in third trimester    -  1       Follow-ups after your visit        Your next 10 appointments already scheduled     May 08, 2018  9:30 AM CDT   ESTABLISHED PRENATAL with Regulo Maldonado MD   [facility] ([facility])    [address]  [place] [address]   [phone]            May 23, 2018  9:15 AM CDT   ESTABLISHED PRENATAL with Linh Guadalupe MD   [facility] ([facility])    [address]  [place] [address]   [phone]            Jun 06, 2018  9:15 AM CDT   ESTABLISHED PRENATAL with Linh Guadalupe MD   [facility] ([facility])    [address]  [place] [address]   [phone]            Jun 13, 2018  9:15 AM CDT   ESTABLISHED PRENATAL with Linh Guadalupe MD   [facility] ([facility])    [address]  [place] [address]   [phone]            Jun 20, 2018  9:15 AM CDT   ESTABLISHED PRENATAL with Linh Guadalupe MD   [facility] ([facility])    [address]  [place] [address]   [phone]            Jun 27, 2018  9:15 AM CDT   ESTABLISHED PRENATAL with Linh Guadalupe MD   [facility] ([facility])    [address]  [place] [address]   [phone]            Jul 03, 2018  9:15 AM CDT   ESTABLISHED PRENATAL with Linh Guadalupe MD   [facility] ([facility])    [address]  [place] [address]   [phone]            Jul 11, 2018  9:15 AM CDT   ESTABLISHED PRENATAL with Linh Guadalupe MD   [facility] ([facility])    [address]  [place] [address]   [phone]            Jul 18, 2018  9:15 AM CDT   ESTABLISHED PRENATAL with Linh Guadalupe MD   [facility] ([facility])    [address]  [place] [address]   [phone]              Who to contact     If you have questions or need follow up information about today's clinic visit or your schedule please contact [facility] directly at [phone].  Normal or non-critical lab and imaging results will be communicated to you by [employer], letter or phone within 4 business days after the clinic has received the results. If you do not hear from us within 7 days, please contact the clinic through [employer] or phone. If you have a critical or abnormal lab result, we will notify you by phone as soon as possible.  Submit refill requests through [employer] or call your pharmacy and they will forward the refill request to us. Please allow 3 business days for your refill to be completed.          Additional Information About Your Visit        [employer] Information     [employer] gives you secure access to your electronic health record. If you see a primary care provider, you can also send messages to your care team and make appointments. If you have questions, please call your primary care clinic.  If you do not have a primary care provider, please call [phone] and they will assist you.        Care EveryWhere ID     This is your Care EveryWhere ID. This could be used by other organizations to access your [place] medical records  [id number]        Your Vitals Were     Pulse Last Period BMI (Body Mass Index)             104 10/07/2017 26 kg/m2          Blood  Pressure from Last 3 Encounters:   04/25/18 118/66   03/29/18 130/72   02/08/18 124/68    Weight from Last 3 Encounters:   04/25/18 70.9 kg (156 lb 4 oz)   03/29/18 68.3 kg (150 lb 9.6 oz)   02/08/18 65.3 kg (144 lb)              We Performed the Following     TDAP VACCINE (BOOSTRIX )        Primary Care Provider Office Phone # Fax #    Linh Guadalupe [phone] [address] [facility] [address]        Equal Access to Services     [facility]: Hadii sj burris hadashbrittney Soodalys, waaxda ngoziqadaha, qaybta kaalmahermes ferrer, aletha rios . So [facility] [phone].    ATENCIÓN: Si habla español, tiene a rodgers disposición servicios gratuitos de asistencia lingüística. Llame al [phone].    We comply with applicable federal civil rights laws and [place] laws. We do not discriminate on the basis of race, color, national origin, age, disability, sex, sexual orientation, or gender identity.            Thank you!     Thank you for choosing [facility] [place] AND [facility]  for your care. Our goal is always to provide you with excellent care. Hearing back from our patients is one way we can continue to improve our services. Please take a few minutes to complete the written survey that you may receive in the mail after your visit with us. Thank you!             Your Updated Medication List - Protect others around you: Learn how to safely use, store and throw away your medicines at www.disposemymeds.org.          This list is accurate as of 4/25/18 10:25 AM.  Always use your most recent med list.                   Brand Name Dispense Instructions for use Diagnosis    [employer]S PRENATAL 28-0.8 MG Tabs      Take 1 tablet by mouth daily        polyethylene glycol powder    MIRALAX/GLYCOLAX     Take 17 g by mouth

## 2018-04-25 NOTE — NURSING NOTE
Patient presents today for her prenatal check-up. She is currently 28w5d.  Katya Rangel LPN  4/25/2018  9:25 AM

## 2018-05-08 ENCOUNTER — PRENATAL OFFICE VISIT (OUTPATIENT)
Dept: OBGYN | Facility: OTHER | Age: 23
End: 2018-05-08
Attending: OBSTETRICS & GYNECOLOGY
Payer: COMMERCIAL

## 2018-05-08 VITALS
WEIGHT: 158.1 LBS | DIASTOLIC BLOOD PRESSURE: 74 MMHG | SYSTOLIC BLOOD PRESSURE: 120 MMHG | BODY MASS INDEX: 26.31 KG/M2 | HEART RATE: 92 BPM

## 2018-05-08 DIAGNOSIS — Z34.93 NORMAL PREGNANCY IN THIRD TRIMESTER: Primary | ICD-10-CM

## 2018-05-08 PROCEDURE — 99207 ZZC OB VISIT-NO CHARGE - GICH ONLY: CPT | Performed by: OBSTETRICS & GYNECOLOGY

## 2018-05-08 ASSESSMENT — PAIN SCALES - GENERAL: PAINLEVEL: NO PAIN (0)

## 2018-05-08 NOTE — PROGRESS NOTES
Return OB Visit    S: Patient reports she has been feeling well. Denies contractions. No VB or LOF. +FM    O: /74 (BP Location: Right arm)  Pulse 92  Wt 71.7 kg (158 lb 1.6 oz)  LMP 10/07/2017  Breastfeeding? No  BMI 26.31 kg/m2  Gen: Well-appearing, NAD  See OB Flowsheet    A/P:  Romeo Funez is a 22 year old  at 30w4d by 7w5d US, here for return OB visit.  Family history of cleft palate: normal level II  Constipation: miralax  GERD: zantac (patient encouraged to start this)    PNC:   - Prenatal labs reviewed, Rh positive, Rubella immune. GCT 75  - Genetics: negative SMA and CF screen. Quad normal  - Imaging: dating US at 7w5d. Normal anatomy  - Immunizations: influenza 2017. Tdap 2018     Recheck 2 weeks with DR JAMES Maldonado MD FACOG  9:53 AM 2018

## 2018-05-08 NOTE — MR AVS SNAPSHOT
After Visit Summary   5/8/2018    Romeo Funez    MRN: [id number]           Patient Information     Date Of Birth          1995        Visit Information        Provider Department      5/8/2018 9:30 AM Regulo Maldonado MD [facility]        Today's Diagnoses     Normal pregnancy in third trimester    -  1       Follow-ups after your visit        Your next 10 appointments already scheduled     May 23, 2018  9:15 AM CDT   ESTABLISHED PRENATAL with Linh Guadalupe MD   [facility] ([facility])    [address]  [place] [address]   [phone]            Jun 06, 2018  9:15 AM CDT   ESTABLISHED PRENATAL with Linh Guadalupe MD   [facility] ([facility])    [address]  [place] [address]   [phone]            Jun 13, 2018  9:15 AM CDT   ESTABLISHED PRENATAL with Linh Guadalupe MD   [facility] ([facility])    [address]  [place] [address]   [phone]            Jun 20, 2018  9:15 AM CDT   ESTABLISHED PRENATAL with Linh Guadalupe MD   [facility] ([facility])    [address]  [place] [address]   [phone]            Jun 27, 2018  9:15 AM CDT   ESTABLISHED PRENATAL with Linh Guadalupe MD   [facility] ([facility])    [address]  [place] [address]   [phone]            Jul 03, 2018  9:15 AM CDT   ESTABLISHED PRENATAL with Linh Guadalupe MD   [facility] ([facility])    [address]  [place] [address]   [phone]            Jul 11, 2018  9:15 AM CDT   ESTABLISHED PRENATAL with Linh Guadalupe MD   [facility] ([facility])    [address]  [place] [address] [phone]            Jul 18, 2018  9:15 AM CDT   ESTABLISHED PRENATAL with Linh Guadalupe MD   [facility] ([facility])    [address]  [place] [address] [phone]              Who to contact     If you have questions or need follow up information about today's clinic visit or your schedule please contact [facility] AND [facility] directly at [phone].  Normal or non-critical lab and imaging results will be communicated to you by The Cleveland Foundationhart, letter or phone within 4 business days after the clinic has received the results. If you do not hear from us within 7 days, please contact the clinic through Flytivity or phone. If you have a critical or abnormal lab result, we will notify you by phone as soon as possible.  Submit refill requests through Flytivity or call your pharmacy and they will forward the refill request to us. Please allow 3 business days for your refill to be completed.          Additional Information About Your Visit        Flytivity Information     Flytivity gives you secure access to your electronic health record. If you see a primary care provider, you can also send messages to your care team and make appointments. If you have questions, please call your primary care clinic.  If you do not have a primary care provider, please call [phone] and they will assist you.        Care EveryWhere ID     This is your Care EveryWhere ID. This could be used by other organizations to access your [facility] medical records  [id number]        Your Vitals Were     Pulse Last Period Breastfeeding? BMI (Body Mass Index)          92 10/07/2017 No 26.31 kg/m2         Blood Pressure from Last 3 Encounters:   05/08/18 120/74   04/25/18 118/66   03/29/18 130/72    Weight from Last 3 Encounters:   05/08/18 71.7 kg (158 lb 1.6 oz)   04/25/18 70.9 kg (156 lb 4 oz)   03/29/18 68.3 kg (150 lb 9.6 oz)              Today,  you had the following     No orders found for display       Primary Care Provider Office Phone # Fax #    Linh Guadalupe [phone] [address]   [facility] [address]        Equal Access to Services     ZBIGNIEW JOHNSON : Hadii sj burris krisho Sonoamali, waaxda luqadaha, qaybta kaalmada adecrystalyada, aletha jaymein hayaalobito lehman inessawilbert felder. So [facility] [phone].    ATENCIÓN: Si habla español, tiene a rodgers disposición servicios gratuitos de asistencia lingüística. Llame al [phone].    We comply with applicable federal civil rights laws and [place] laws. We do not discriminate on the basis of race, color, national origin, age, disability, sex, sexual orientation, or gender identity.            Thank you!     Thank you for choosing [facility] AND [facility]  for your care. Our goal is always to provide you with excellent care. Hearing back from our patients is one way we can continue to improve our services. Please take a few minutes to complete the written survey that you may receive in the mail after your visit with us. Thank you!             Your Updated Medication List - Protect others around you: Learn how to safely use, store and throw away your medicines at www.disposemymeds.org.          This list is accurate as of 5/8/18 10:19 AM.  Always use your most recent med list.                   Brand Name Dispense Instructions for use Diagnosis    CVS PRENATAL 28-0.8 MG Tabs      Take 1 tablet by mouth daily        polyethylene glycol powder    MIRALAX/GLYCOLAX     Take 17 g by mouth

## 2018-05-23 ENCOUNTER — PRENATAL OFFICE VISIT (OUTPATIENT)
Dept: OBGYN | Facility: OTHER | Age: 23
End: 2018-05-23
Attending: OBSTETRICS & GYNECOLOGY
Payer: COMMERCIAL

## 2018-05-23 VITALS
WEIGHT: 158.13 LBS | HEART RATE: 116 BPM | BODY MASS INDEX: 26.31 KG/M2 | DIASTOLIC BLOOD PRESSURE: 70 MMHG | SYSTOLIC BLOOD PRESSURE: 132 MMHG

## 2018-05-23 DIAGNOSIS — Z34.03 ENCOUNTER FOR SUPERVISION OF NORMAL FIRST PREGNANCY IN THIRD TRIMESTER: Primary | ICD-10-CM

## 2018-05-23 DIAGNOSIS — B36.0 TINEA VERSICOLOR: ICD-10-CM

## 2018-05-23 PROCEDURE — 99207 ZZC OB VISIT-NO CHARGE - GICH ONLY: CPT | Performed by: OBSTETRICS & GYNECOLOGY

## 2018-05-23 RX ORDER — KETOCONAZOLE 20 MG/ML
SHAMPOO TOPICAL
Qty: 120 ML | Refills: 1 | Status: SHIPPED | OUTPATIENT
Start: 2018-05-23 | End: 2018-09-04

## 2018-05-23 ASSESSMENT — PAIN SCALES - GENERAL: PAINLEVEL: NO PAIN (0)

## 2018-05-23 NOTE — MR AVS SNAPSHOT
After Visit Summary   5/23/2018    Romeo Funez    MRN: [id number]           Patient Information     Date Of Birth          1995        Visit Information        Provider Department      5/23/2018 9:15 AM Linh Guadalupe MD [facility]        Today's Diagnoses     Encounter for supervision of normal first pregnancy in third trimester    -  1    Tinea versicolor           Follow-ups after your visit        Your next 10 appointments already scheduled     Jun 06, 2018  9:15 AM CDT   ESTABLISHED PRENATAL with Linh Guadalupe MD   [facility]    [address]  [place] [address]   [phone]            Jun 13, 2018  9:15 AM CDT   ESTABLISHED PRENATAL with Linh Guadalupe MD   [facility]    [address]  [place] [address]   [phone]            Jun 20, 2018  9:15 AM CDT   ESTABLISHED PRENATAL with Linh Guadalupe MD   [facility]    [address]  [place] [address]   [phone]            Jun 27, 2018  9:15 AM CDT   ESTABLISHED PRENATAL with Linh Guadalupe MD   [facility]    [address]  [place] [address]   [phone]            Jul 03, 2018  9:15 AM CDT   ESTABLISHED PRENATAL with Linh Guadalupe MD   [facility]    [address]  [place] [address]   [phone]            Jul 11, 2018  9:15 AM CDT   ESTABLISHED PRENATAL with Linh Guadalupe MD   [facility]    [address]  [place] [address]   [phone]            Jul 18, 2018  9:15 AM CDT   ESTABLISHED PRENATAL with Linh Guadalupe MD   [facility]    [address]  [place] [address] [phone]              Who to contact     If you have questions or need follow up information about today's clinic visit or your schedule please contact [facility] directly at [phone].  Normal or non-critical lab and imaging results will be communicated to you by Wheelzhart, letter or phone within 4 business days after the clinic has received the results. If you do not hear from us within 7 days, please contact the clinic through AppLovint or phone. If you have a critical or abnormal lab result, we will notify you by phone as soon as possible.  Submit refill requests through Teads or call your pharmacy and they will forward the refill request to us. Please allow 3 business days for your refill to be completed.          Additional Information About Your Visit        Wheelzhart Information     Teads gives you secure access to your electronic health record. If you see a primary care provider, you can also send messages to your care team and make appointments. If you have questions, please call your primary care clinic.  If you do not have a primary care provider, please call [phone] and they will assist you.        Care EveryWhere ID     This is your Care EveryWhere ID. This could be used by other organizations to access your [facility] medical records  [id number]        Your Vitals Were     Pulse Last Period BMI (Body Mass Index)             116 10/07/2017 26.31 kg/m2          Blood Pressure from Last 3 Encounters:   05/23/18 132/70   05/08/18 120/74   04/25/18 118/66    Weight from Last 3 Encounters:   05/23/18 71.7 kg (158 lb 2 oz)   05/08/18 71.7 kg (158 lb 1.6 oz)   04/25/18 70.9 kg (156 lb 4 oz)              Today, you had the following     No orders found for display         Today's Medication Changes          These changes are accurate as of 5/23/18  3:22 PM.  If you have any  questions, ask your nurse or doctor.               Start taking these medicines.        Dose/Directions    ketoconazole 2 % shampoo   Commonly known as:  NIZORAL   Used for:  Tinea versicolor   Started by:  Linh Guadalupe MD        Apply to the affected area and wash off after 5 minutes.   Quantity:  120 mL   Refills:  1            Where to get your medicines      These medications were sent to [id number] - [place], [place] - [address] AT [facility] [address], [place] [address]     Phone:  [phone]     ketoconazole 2 % shampoo                Primary Care Provider Office Phone # Fax #    Linh Guadalupe [phone] [address] [facility] [address]        Equal Access to Services     [facility]: Hadii aad ku hadasho Soomaali, waaxda luqadaha, qaybta kaalmada adeegyada, waxadams rios . So [facility] [phone].    ATENCIÓN: Si habla español, tiene a rodgers disposición servicios gratuitos de asistencia lingüística. [facility] [phone].    We comply with applicable federal civil rights laws and [place] laws. We do not discriminate on the basis of race, color, national origin, age, disability, sex, sexual orientation, or gender identity.            Thank you!     Thank you for choosing [facility] AND [facility]  for your care. Our goal is always to provide you with excellent care. Hearing back from our patients is one way we can continue to improve our services. Please take a few minutes to complete the written survey that you may receive in the mail after your visit with us. Thank you!             Your Updated Medication List - Protect others around you: Learn how to safely use, store and throw away your medicines at www.disposemymeds.org.          This list is accurate as of 5/23/18  3:22 PM.  Always use your most recent med list.                   Brand Name Dispense Instructions for  use Diagnosis    CVS PRENATAL 28-0.8 MG Tabs      Take 1 tablet by mouth daily        ketoconazole 2 % shampoo    NIZORAL    120 mL    Apply to the affected area and wash off after 5 minutes.    Tinea versicolor       polyethylene glycol powder    MIRALAX/GLYCOLAX     Take 17 g by mouth

## 2018-05-23 NOTE — NURSING NOTE
Patient presents today for her prenatal check-up. She is currently 32w5d.  Katya Rangel LPN  5/23/2018  9:14 AM

## 2018-05-23 NOTE — PROGRESS NOTES
Return OB Visit    S: Patient has been having a very difficult last few days. She went home to visit her family this past weekend without her boyfriend, found out he didn't go to work and had been using methamphetamines. They were supposed to be moving in together next week in anticipation of the upcoming birth, however, when she told him she no longer wanted to do this because of his drug use he made several disturbing comments and ultimately was admitted to the hospital for suicidal ideation. They started dating just prior to her getting pregnant and she does not know him that well. When she discussed his behaviors with his family, she discovered that he has had this pattern of behavior in the past and his family has suspected prior drug use with methamphetamines. She is very stressed about the situation because she needs to be out of her rental house at the end of  and cannot afford to live on her own. She has a very supportive family and could live with her parents, however, she likes her job and does not want to leave. She has had intermittent thoughts of suicide but no plan and knows she would not follow through with a plan as she loves her family too much.     She had BH ctx every 10 min two nights ago when she was with her boyfriend in the ED, no VB or LOF. No ctx since. +FM. She also has noticed two small spots on her chest over the past week. She had this previously and was told it was a fungal infection, resolved with a medicated shampoo.    O: /70 (BP Location: Right arm, Patient Position: Chair, Cuff Size: Adult Regular)  Pulse 116  Wt 71.7 kg (158 lb 2 oz)  LMP 10/07/2017  BMI 26.31 kg/m2  Gen: Intermittently tearful but overall good eye contact, open and communicative  Chest: two 1cm macules of brown hyperpigmentation with central clearing.  See OB Flowsheet    A/P:  Romeo Funez is a 22 year old  at 32w5d by 7w5d US, here for return OB visit.  Family history of cleft  palate: normal level II  Constipation: miralax  GERD: zantac   Social situation: discussed extensively today. She has a depressed mood but has a safe plan, good family support. Encouraged her to call if she feels her mood is worsening.  Tinea versicolor: ketoconazole shampoo ordered    PNC:   - Prenatal labs reviewed, Rh positive, Rubella immune. GCT 75  - Genetics: negative SMA and CF screen. Quad normal  - Imaging: dating US at 7w5d. Normal anatomy  - Immunizations: influenza 12/22/2017. Tdap 4/25/2018   RTC 2 weeks    Linh Guadalupe MD  OB/GYN  5/23/2018 9:19 AM

## 2018-05-24 ASSESSMENT — PATIENT HEALTH QUESTIONNAIRE - PHQ9: SUM OF ALL RESPONSES TO PHQ QUESTIONS 1-9: 13

## 2018-06-06 ENCOUNTER — PRENATAL OFFICE VISIT (OUTPATIENT)
Dept: OBGYN | Facility: OTHER | Age: 23
End: 2018-06-06
Attending: OBSTETRICS & GYNECOLOGY
Payer: COMMERCIAL

## 2018-06-06 VITALS
HEART RATE: 88 BPM | BODY MASS INDEX: 26.71 KG/M2 | WEIGHT: 160.5 LBS | DIASTOLIC BLOOD PRESSURE: 74 MMHG | SYSTOLIC BLOOD PRESSURE: 122 MMHG

## 2018-06-06 DIAGNOSIS — Z34.03 ENCOUNTER FOR SUPERVISION OF NORMAL FIRST PREGNANCY IN THIRD TRIMESTER: Primary | ICD-10-CM

## 2018-06-06 PROCEDURE — 99207 ZZC OB VISIT-NO CHARGE - GICH ONLY: CPT | Performed by: OBSTETRICS & GYNECOLOGY

## 2018-06-06 ASSESSMENT — PAIN SCALES - GENERAL: PAINLEVEL: NO PAIN (0)

## 2018-06-06 NOTE — PROGRESS NOTES
Return OB Visit    S: Patient has been doing better. Has occasional ctx but improved from before. No VB or LOF. +FM. Mood is better. Presents today with her boyfriend, reports things are going okay.    O: /74 (BP Location: Right arm, Patient Position: Sitting, Cuff Size: Adult Large)  Pulse 88  Wt 72.8 kg (160 lb 8 oz)  LMP 10/07/2017  BMI 26.71 kg/m2  Gen: Well-appearing, NAD  See OB Flowsheet    A/P:  Romeo Funez is a 22 year old  at 34w5d by 7w5d US, here for return OB visit.  Family history of cleft palate: normal level II  Constipation: miralax  GERD: zantac   Social situation: stable to improved  Plans epidural, breastfeeding  Undecided on contraception, handouts provided     PNC:   - Prenatal labs reviewed, Rh positive, Rubella immune. GCT 75  - Genetics: negative SMA and CF screen. Quad normal  - Imaging: dating US at 7w5d. Normal anatomy  - Immunizations: influenza 2017. Tdap 2018   RTC 2 weeks    Linh Guadalupe MD  OB/GYN  2018 9:33 AM

## 2018-06-06 NOTE — MR AVS SNAPSHOT
After Visit Summary   6/6/2018    Romeo Funez    MRN: [id number]           Patient Information     Date Of Birth          1995        Visit Information        Provider Department      6/6/2018 9:15 AM Linh Guadalupe MD [facility]        Today's Diagnoses     Encounter for supervision of normal first pregnancy in third trimester    -  1      Care Instructions    Jenifer Chung [phone]          Follow-ups after your visit        Your next 10 appointments already scheduled     Jun 13, 2018  9:15 AM CDT   ESTABLISHED PRENATAL with Linh Guadalupe MD   [facility] ([facility])    [address]  [facility] [address]   [phone]            Jun 20, 2018  9:15 AM CDT   ESTABLISHED PRENATAL with Linh Guadalupe MD   [facility] ([facility])    [address]  [facility] [address]   [phone]            Jun 27, 2018  9:15 AM CDT   ESTABLISHED PRENATAL with Linh Guadalupe MD   [facility] ([facility])    [address]  [place] [address]   [phone]            Jul 03, 2018  9:15 AM CDT   ESTABLISHED PRENATAL with Linh Guadalupe MD   [facility] ([facility])    [address]  [facility] [address]   [phone]            Jul 11, 2018  9:15 AM CDT   ESTABLISHED PRENATAL with Linh Guadalupe MD   [facility] ([facility])    [address]  [facility] [address]   [phone]            Jul 18, 2018  9:15 AM CDT   ESTABLISHED PRENATAL with Linh Guadalupe MD   [facility] ([facility])    [address]  [facility] [address]   [phone]              Who to contact     If you have questions or need follow up information  about today's clinic visit or your schedule please contact [facility] directly at [phone].  Normal or non-critical lab and imaging results will be communicated to you by MyChart, letter or phone within 4 business days after the clinic has received the results. If you do not hear from us within 7 days, please contact the clinic through Aprilagehart or phone. If you have a critical or abnormal lab result, we will notify you by phone as soon as possible.  Submit refill requests through GreenWizard or call your pharmacy and they will forward the refill request to us. Please allow 3 business days for your refill to be completed.          Additional Information About Your Visit        AprilageharQuickcue Information     GreenWizard gives you secure access to your electronic health record. If you see a primary care provider, you can also send messages to your care team and make appointments. If you have questions, please call your primary care clinic.  If you do not have a primary care provider, please call [phone] and they will assist you.        Care EveryWhere ID     This is your Care EveryWhere ID. This could be used by other organizations to access your [facility] medical records  [id number]        Your Vitals Were     Pulse Last Period BMI (Body Mass Index)             88 10/07/2017 26.71 kg/m2          Blood Pressure from Last 3 Encounters:   06/06/18 122/74   05/23/18 132/70   05/08/18 120/74    Weight from Last 3 Encounters:   06/06/18 72.8 kg (160 lb 8 oz)   05/23/18 71.7 kg (158 lb 2 oz)   05/08/18 71.7 kg (158 lb 1.6 oz)              Today, you had the following     No orders found for display       Primary Care Provider Office Phone # Fax #    Linh Guadalupe [phone] [address]  [facility] [address]        Equal Access to Services     RAYRAY JOHNSON AH: Ruben Raphael, waaxda luqadaha, qaybta kaalmahermes ferrer, aletha rios  ah. So [facility] [phone].    ATENCIÓN: Si maribell grajeda, tiene a rodgers disposición servicios gratuitos de asistencia lingüística. Radha al [phone].    We comply with applicable federal civil rights laws and [place] laws. We do not discriminate on the basis of race, color, national origin, age, disability, sex, sexual orientation, or gender identity.            Thank you!     Thank you for choosing [facility] AND [facility]  for your care. Our goal is always to provide you with excellent care. Hearing back from our patients is one way we can continue to improve our services. Please take a few minutes to complete the written survey that you may receive in the mail after your visit with us. Thank you!             Your Updated Medication List - Protect others around you: Learn how to safely use, store and throw away your medicines at www.disposemymeds.org.          This list is accurate as of 6/6/18 10:16 AM.  Always use your most recent med list.                   Brand Name Dispense Instructions for use Diagnosis    CVS PRENATAL 28-0.8 MG Tabs      Take 1 tablet by mouth daily        ketoconazole 2 % shampoo    NIZORAL    120 mL    Apply to the affected area and wash off after 5 minutes.    Tinea versicolor       polyethylene glycol powder    MIRALAX/GLYCOLAX     Take 17 g by mouth

## 2018-06-06 NOTE — NURSING NOTE
Patient presents today for her prenatal check-up. She is currently 34w5d.  Katya Rangel LPN  6/6/2018  9:19 AM

## 2018-06-07 ASSESSMENT — PATIENT HEALTH QUESTIONNAIRE - PHQ9: SUM OF ALL RESPONSES TO PHQ QUESTIONS 1-9: 2

## 2018-06-13 ENCOUNTER — PRENATAL OFFICE VISIT (OUTPATIENT)
Dept: OBGYN | Facility: OTHER | Age: 23
End: 2018-06-13
Attending: OBSTETRICS & GYNECOLOGY
Payer: COMMERCIAL

## 2018-06-13 VITALS
SYSTOLIC BLOOD PRESSURE: 136 MMHG | DIASTOLIC BLOOD PRESSURE: 68 MMHG | BODY MASS INDEX: 27.14 KG/M2 | HEART RATE: 108 BPM | WEIGHT: 163.06 LBS

## 2018-06-13 DIAGNOSIS — Z34.03 ENCOUNTER FOR SUPERVISION OF NORMAL FIRST PREGNANCY IN THIRD TRIMESTER: Primary | ICD-10-CM

## 2018-06-13 PROCEDURE — 99207 ZZC OB VISIT-NO CHARGE - GICH ONLY: CPT | Performed by: OBSTETRICS & GYNECOLOGY

## 2018-06-13 PROCEDURE — 87081 CULTURE SCREEN ONLY: CPT | Performed by: OBSTETRICS & GYNECOLOGY

## 2018-06-13 ASSESSMENT — PAIN SCALES - GENERAL: PAINLEVEL: NO PAIN (0)

## 2018-06-13 NOTE — PROGRESS NOTES
Return OB Visit    S: Patient has been feeling well. No ctx, VB or LOF. +FM.    O: /68 (BP Location: Right arm, Patient Position: Sitting, Cuff Size: Adult Large)  Pulse 108  Wt 74 kg (163 lb 1 oz)  LMP 10/07/2017  BMI 27.14 kg/m2  Gen: Well-appearing, NAD  See OB Flowsheet    A/P:  Romeo Funez is a 22 year old  at 35w5d by 7w5d US, here for return OB visit.  Family history of cleft palate: normal level II  Constipation: miralax  GERD: zantac   Social situation: stable to improved  Plans epidural, breastfeeding  Undecided on contraception, reviewed options again      PNC:   - Prenatal labs reviewed, Rh positive, Rubella immune. GCT 75. GBS 2018   - Genetics: negative SMA and CF screen. Quad normal  - Imaging: dating US at 7w5d. Normal anatomy  - Immunizations: influenza 2017. Tdap 2018   RTC weekly until delivery    Linh Guadalupe MD  OB/GYN  2018 9:24 AM

## 2018-06-13 NOTE — NURSING NOTE
Patient presents today for her prenatal check-up. She is currently 35w5d.  Katya Rangel LPN  6/13/2018  9:16 AM

## 2018-06-13 NOTE — MR AVS SNAPSHOT
After Visit Summary   6/13/2018    Romeo Funez    MRN: [id number]           Patient Information     Date Of Birth          1995        Visit Information        Provider Department      6/13/2018 9:15 AM Linh Guadalupe MD [facility]        Today's Diagnoses     Encounter for supervision of normal first pregnancy in third trimester    -  1      Care Instructions    Women's Health and Birth: [phone]          Follow-ups after your visit        Your next 10 appointments already scheduled     Jun 20, 2018  9:15 AM CDT   ESTABLISHED PRENATAL with Linh Guadalupe MD   [facility]    [address]  [place] [address]   [phone]            Jun 27, 2018  9:15 AM CDT   ESTABLISHED PRENATAL with Linh Guadalupe MD   [facility]    [address]  [place] [address]   [phone]            Jul 03, 2018  9:15 AM CDT   ESTABLISHED PRENATAL with Linh Guadalupe MD   [facility]    [address]  [place] [address]   [phone]            Jul 11, 2018  9:15 AM CDT   ESTABLISHED PRENATAL with Linh Guadalupe MD   [facility]    [address]  [place] [address]   [phone]            Jul 18, 2018  9:15 AM CDT   ESTABLISHED PRENATAL with Linh Guadalupe MD   [facility]    [address]  [place] [address]   [phone]              Who to contact     If you have questions or need follow up information about today's clinic visit or your schedule please contact [facility] directly at [phone].  Normal or non-critical lab and imaging results will be communicated to you by Mario  letter or phone within 4 business days after the clinic has received the results. If you do not hear from us within 7 days, please contact the clinic through Barnebys or phone. If you have a critical or abnormal lab result, we will notify you by phone as soon as possible.  Submit refill requests through Barnebys or call your pharmacy and they will forward the refill request to us. Please allow 3 business days for your refill to be completed.          Additional Information About Your Visit        Zad[employer] Information     Barnebys gives you secure access to your electronic health record. If you see a primary care provider, you can also send messages to your care team and make appointments. If you have questions, please call your primary care clinic.  If you do not have a primary care provider, please call [phone] and they will assist you.        Care EveryWhere ID     This is your Care EveryWhere ID. This could be used by other organizations to access your [place] medical records  [id number]        Your Vitals Were     Pulse Last Period BMI (Body Mass Index)             108 10/07/2017 27.14 kg/m2          Blood Pressure from Last 3 Encounters:   06/13/18 136/68   06/06/18 122/74   05/23/18 132/70    Weight from Last 3 Encounters:   06/13/18 74 kg (163 lb 1 oz)   06/06/18 72.8 kg (160 lb 8 oz)   05/23/18 71.7 kg (158 lb 2 oz)              We Performed the Following     Rectal/Vag Group B Strep Culture        Primary Care Provider Office Phone # Fax #    Linh Guadalupe [phone]       [address] [facility] [address]        Equal Access to Services     [facility]: Hadii aad ku hadasho Soodalys, waaxda luqadaha, qaybta kaalmada aletha ferrer . So [facility] [phone].    ATENCIÓN: Si habla español, tiene a rodgers disposición servicios gratuitos de asistencia lingüística. Llame al [phone].    We comply with applicable federal civil rights laws  and [place] laws. We do not discriminate on the basis of race, color, national origin, age, disability, sex, sexual orientation, or gender identity.            Thank you!     Thank you for choosing [facility] AND [facility]  for your care. Our goal is always to provide you with excellent care. Hearing back from our patients is one way we can continue to improve our services. Please take a few minutes to complete the written survey that you may receive in the mail after your visit with us. Thank you!             Your Updated Medication List - Protect others around you: Learn how to safely use, store and throw away your medicines at www.disposemymeds.org.          This list is accurate as of 6/13/18  9:44 AM.  Always use your most recent med list.                   Brand Name Dispense Instructions for use Diagnosis    CVS PRENATAL 28-0.8 MG Tabs      Take 1 tablet by mouth daily        ketoconazole 2 % shampoo    NIZORAL    120 mL    Apply to the affected area and wash off after 5 minutes.    Tinea versicolor       polyethylene glycol powder    MIRALAX/GLYCOLAX     Take 17 g by mouth

## 2018-06-14 ASSESSMENT — PATIENT HEALTH QUESTIONNAIRE - PHQ9: SUM OF ALL RESPONSES TO PHQ QUESTIONS 1-9: 1

## 2018-06-15 LAB
BACTERIA SPEC CULT: NORMAL
SPECIMEN SOURCE: NORMAL

## 2018-06-20 ENCOUNTER — PRENATAL OFFICE VISIT (OUTPATIENT)
Dept: OBGYN | Facility: OTHER | Age: 23
End: 2018-06-20
Attending: OBSTETRICS & GYNECOLOGY
Payer: COMMERCIAL

## 2018-06-20 VITALS
WEIGHT: 163.3 LBS | BODY MASS INDEX: 27.17 KG/M2 | RESPIRATION RATE: 18 BRPM | HEART RATE: 92 BPM | SYSTOLIC BLOOD PRESSURE: 136 MMHG | DIASTOLIC BLOOD PRESSURE: 82 MMHG

## 2018-06-20 DIAGNOSIS — Z34.03 ENCOUNTER FOR SUPERVISION OF NORMAL FIRST PREGNANCY IN THIRD TRIMESTER: Primary | ICD-10-CM

## 2018-06-20 PROCEDURE — 99207 ZZC OB VISIT-NO CHARGE - GICH ONLY: CPT | Performed by: OBSTETRICS & GYNECOLOGY

## 2018-06-20 ASSESSMENT — PAIN SCALES - GENERAL: PAINLEVEL: NO PAIN (0)

## 2018-06-20 NOTE — MR AVS SNAPSHOT
After Visit Summary   6/20/2018    Romeo Funez    MRN: [id number]           Patient Information     Date Of Birth          1995        Visit Information        Provider Department      6/20/2018 9:15 AM Linh Guadalupe MD [facility]        Today's Diagnoses     Encounter for supervision of normal first pregnancy in third trimester    -  1       Follow-ups after your visit        Your next 10 appointments already scheduled     Jun 27, 2018  9:15 AM CDT   ESTABLISHED PRENATAL with Linh Guadalupe MD   [facility] ([facility]    [address]  [facility] [address]   [phone]            Jul 03, 2018  9:15 AM CDT   ESTABLISHED PRENATAL with Linh Guadalupe MD   [facility] ([facility])    [address]  [place] [facility] [address]   [phone]            Jul 11, 2018  9:15 AM CDT   ESTABLISHED PRENATAL with Linh Guadalupe MD   [facility] ([facility])    [address]  [place] [address]   [phone]            Jul 18, 2018  9:15 AM CDT   ESTABLISHED PRENATAL with Linh Guadalupe MD   [facility]    [address]  [place] [address]   [phone]              Who to contact     If you have questions or need follow up information about today's clinic visit or your schedule please contact [facility] directly at [phone].  Normal or non-critical lab and imaging results will be communicated to you by MyChart, letter or phone within 4 business days after the clinic has received the results. If you do not hear from us within 7 days, please contact the clinic through MyChart or phone. If you have a critical or abnormal lab result, we will notify you by phone as soon as possible.  Submit refill requests  through PA Semi or call your pharmacy and they will forward the refill request to us. Please allow 3 business days for your refill to be completed.          Additional Information About Your Visit        [employer] Information     [employer] gives you secure access to your electronic health record. If you see a primary care provider, you can also send messages to your care team and make appointments. If you have questions, please call your primary care clinic.  If you do not have a primary care provider, please call [phone] and they will assist you.        Care EveryWhere ID     This is your Care EveryWhere ID. This could be used by other organizations to access your [place] medical records  [id number]        Your Vitals Were     Pulse Respirations Last Period BMI (Body Mass Index)          92 18 10/07/2017 27.17 kg/m2         Blood Pressure from Last 3 Encounters:   06/20/18 136/82   06/13/18 136/68   06/06/18 122/74    Weight from Last 3 Encounters:   06/20/18 74.1 kg (163 lb 4.8 oz)   06/13/18 74 kg (163 lb 1 oz)   06/06/18 72.8 kg (160 lb 8 oz)              Today, you had the following     No orders found for display       Primary Care Provider Office Phone # Fax #    Linh Guadalupe [phone]       [address] [facility] [address]        Equal Access to Services     [facility] : Hadii aad ku hadasho Soomaali, waaxda luqadaha, qaybta kaalmada adeegyada, aletha felder. So [facility] [phone].    ATENCIÓN: Si habla español, tiene a rodgers disposición servicios gratuitos de asistencia lingüística. Llhaily al [phone].    We comply with applicable federal civil rights laws and [place] laws. We do not discriminate on the basis of race, color, national origin, age, disability, sex, sexual orientation, or gender identity.            Thank you!     Thank you for choosing [facility] AND hospitals  for your care. Our goal is always to provide you with  excellent care. Hearing back from our patients is one way we can continue to improve our services. Please take a few minutes to complete the written survey that you may receive in the mail after your visit with us. Thank you!             Your Updated Medication List - Protect others around you: Learn how to safely use, store and throw away your medicines at www.disposemymeds.org.          This list is accurate as of 6/20/18  9:52 AM.  Always use your most recent med list.                   Brand Name Dispense Instructions for use Diagnosis    CVS PRENATAL 28-0.8 MG Tabs      Take 1 tablet by mouth daily        ketoconazole 2 % shampoo    NIZORAL    120 mL    Apply to the affected area and wash off after 5 minutes.    Tinea versicolor       polyethylene glycol powder    MIRALAX/GLYCOLAX     Take 17 g by mouth

## 2018-06-20 NOTE — NURSING NOTE
Patient presents to theclinic for Ob check at 36 weeks  and 5 days.  Reports she had one episode of swollen feet after being on her feet for 9 hours during the day.  States resolved by morning.  Kori Allen........................6/20/2018  9:38 AM

## 2018-06-20 NOTE — PROGRESS NOTES
Return OB Visit    S: Patient has been feeling well. No ctx, VB or LOF. +FM.     O: /82 (BP Location: Right arm, Patient Position: Sitting, Cuff Size: Adult Large)  Pulse 92  Resp 18  Wt 74.1 kg (163 lb 4.8 oz)  LMP 10/07/2017  BMI 27.17 kg/m2  Gen: Well-appearing, NAD  See OB Flowsheet    A/P:  Romeo Funez is a 22 year old  at 36w5d by 7w5d US, here for return OB visit.  Family history of cleft palate: normal level II  Constipation: miralax  GERD: zantac   Social situation: stable to improved  Plans epidural, breastfeeding  Undecided on contraception, reviewed options again      PNC:   - Prenatal labs reviewed, Rh positive, Rubella immune. GCT 75. GBS negative  - Genetics: negative SMA and CF screen. Quad normal  - Imaging: dating US at 7w5d. Normal anatomy  - Immunizations: influenza 2017. Tdap 2018   RTC weekly until delivery    Linh Guadalupe MD  OB/GYN  2018 9:35 AM

## 2018-06-27 ENCOUNTER — PRENATAL OFFICE VISIT (OUTPATIENT)
Dept: OBGYN | Facility: OTHER | Age: 23
End: 2018-06-27
Attending: OBSTETRICS & GYNECOLOGY
Payer: COMMERCIAL

## 2018-06-27 VITALS
SYSTOLIC BLOOD PRESSURE: 128 MMHG | HEART RATE: 92 BPM | DIASTOLIC BLOOD PRESSURE: 76 MMHG | BODY MASS INDEX: 27.48 KG/M2 | WEIGHT: 165.13 LBS

## 2018-06-27 DIAGNOSIS — Z34.03 ENCOUNTER FOR SUPERVISION OF NORMAL FIRST PREGNANCY IN THIRD TRIMESTER: Primary | ICD-10-CM

## 2018-06-27 PROCEDURE — 99207 ZZC OB VISIT-NO CHARGE - GICH ONLY: CPT | Performed by: OBSTETRICS & GYNECOLOGY

## 2018-06-27 ASSESSMENT — PAIN SCALES - GENERAL: PAINLEVEL: NO PAIN (0)

## 2018-06-27 NOTE — PROGRESS NOTES
Return OB Visit    S: Patient has been doing okay, getting more uncomfortable. Occasional BH ctx, no VB or LOF. +FM. Getting her apartment packed up and moving in with a friend. This is causing increased mood swings and anxiety, especially in the last few days. Her dad and brother are coming up this weekend to help move and hopes she can get settled in before the baby delivers.  Relationship with FOB is getting better, he is having his anxiety treated and now has a stable job and housing situation.     O: /76 (BP Location: Right arm, Patient Position: Sitting, Cuff Size: Adult Large)  Pulse 92  Wt 74.9 kg (165 lb 2 oz)  LMP 10/07/2017  BMI 27.48 kg/m2  Gen: Well-appearing, NAD  See OB Flowsheet    A/P:  Romeo Funez is a 22 year old  at 37w5d by 7w5d US, here for return OB visit.  Family history of cleft palate: normal level II  Constipation: miralax  GERD: zantac   Social situation: stable to improved  Plans epidural, breastfeeding  Undecided on contraception, reviewed options again      PNC:   - Prenatal labs reviewed, Rh positive, Rubella immune. GCT 75. GBS negative  - Genetics: negative SMA and CF screen. Quad normal  - Imaging: dating US at 7w5d. Normal anatomy  - Immunizations: influenza 2017. Tdap 2018   RTC weekly until delivery    Linh Guadalupe MD  OB/GYN  2018 9:23 AM

## 2018-06-27 NOTE — MR AVS SNAPSHOT
After Visit Summary   6/27/2018    Romeo Funez    MRN: [id number]           Patient Information     Date Of Birth          1995        Visit Information        Provider Department      6/27/2018 9:15 AM Linh Guadalupe MD [facility]        Today's Diagnoses     Encounter for supervision of normal first pregnancy in third trimester    -  1       Follow-ups after your visit        Your next 10 appointments already scheduled     Jul 03, 2018  9:15 AM CDT   ESTABLISHED PRENATAL with Linh Guadalupe MD   [facility] ([facility])    [address]  [place] [address]   [phone]            Jul 11, 2018  9:15 AM CDT   ESTABLISHED PRENATAL with Linh Guadalupe MD   [facility] ([facility])    [address]  [place] [address]   [phone]            Jul 18, 2018  9:15 AM CDT   ESTABLISHED PRENATAL with Linh Guadalupe MD   [facility] ([facility])    [address]  [place] [address]   [phone]              Who to contact     If you have questions or need follow up information about today's clinic visit or your schedule please contact [facility] directly at [phone].  Normal or non-critical lab and imaging results will be communicated to you by MyChart, letter or phone within 4 business days after the clinic has received the results. If you do not hear from us within 7 days, please contact the clinic through Salonmeisterhart or phone. If you have a critical or abnormal lab result, we will notify you by phone as soon as possible.  Submit refill requests through [employer] or call your pharmacy and they will forward the refill request to us. Please allow 3 business days for your refill to be completed.          Additional Information About Your Visit        [facility]  Information     [employer] gives you secure access to your electronic health record. If you see a primary care provider, you can also send messages to your care team and make appointments. If you have questions, please call your primary care clinic.  If you do not have a primary care provider, please call [phone] and they will assist you.        Care EveryWhere ID     This is your Care EveryWhere ID. This could be used by other organizations to access your [place] medical records  [id number]        Your Vitals Were     Pulse Last Period BMI (Body Mass Index)             92 10/07/2017 27.48 kg/m2          Blood Pressure from Last 3 Encounters:   06/27/18 128/76   06/20/18 136/82   06/13/18 136/68    Weight from Last 3 Encounters:   06/27/18 74.9 kg (165 lb 2 oz)   06/20/18 74.1 kg (163 lb 4.8 oz)   06/13/18 74 kg (163 lb 1 oz)              Today, you had the following     No orders found for display       Primary Care Provider Office Phone # Fax #    Linh Guadalupe [phone]       [address] [facility] [address]        Equal Access to Services     [facility]: Hadii aad ku hadasho Sonoamali, waaxda luqadaha, qaybta kaalmada adeegyada, aletha felder. So [facility] [phone].    ATENCIÓN: Si habla español, tiene a rodgers disposición servicios gratuitos de asistencia lingüística. Radha al [phone].    We comply with applicable federal civil rights laws and [place] laws. We do not discriminate on the basis of race, color, national origin, age, disability, sex, sexual orientation, or gender identity.            Thank you!     Thank you for choosing [facility] AND [facility]  for your care. Our goal is always to provide you with excellent care. Hearing back from our patients is one way we can continue to improve our services. Please take a few minutes to complete the written survey that you may receive in the mail after your visit with us. Thank  you!             Your Updated Medication List - Protect others around you: Learn how to safely use, store and throw away your medicines at www.disposemymeds.org.          This list is accurate as of 6/27/18  9:47 AM.  Always use your most recent med list.                   Brand Name Dispense Instructions for use Diagnosis    CVS PRENATAL 28-0.8 MG Tabs      Take 1 tablet by mouth daily        ketoconazole 2 % shampoo    NIZORAL    120 mL    Apply to the affected area and wash off after 5 minutes.    Tinea versicolor       polyethylene glycol powder    MIRALAX/GLYCOLAX     Take 17 g by mouth

## 2018-06-27 NOTE — NURSING NOTE
Patient presents today for her prenatal check-up. She is currently 37w5d.  Katya Rangel LPN  6/27/2018  9:20 AM

## 2018-06-28 ASSESSMENT — PATIENT HEALTH QUESTIONNAIRE - PHQ9: SUM OF ALL RESPONSES TO PHQ QUESTIONS 1-9: 2

## 2018-07-03 ENCOUNTER — PRENATAL OFFICE VISIT (OUTPATIENT)
Dept: OBGYN | Facility: OTHER | Age: 23
End: 2018-07-03
Attending: OBSTETRICS & GYNECOLOGY
Payer: COMMERCIAL

## 2018-07-03 VITALS
DIASTOLIC BLOOD PRESSURE: 66 MMHG | WEIGHT: 165.44 LBS | HEART RATE: 88 BPM | BODY MASS INDEX: 27.53 KG/M2 | SYSTOLIC BLOOD PRESSURE: 122 MMHG

## 2018-07-03 DIAGNOSIS — Z34.03 ENCOUNTER FOR SUPERVISION OF NORMAL FIRST PREGNANCY IN THIRD TRIMESTER: Primary | ICD-10-CM

## 2018-07-03 PROCEDURE — 99207 ZZC OB VISIT-NO CHARGE - GICH ONLY: CPT | Performed by: OBSTETRICS & GYNECOLOGY

## 2018-07-03 ASSESSMENT — PAIN SCALES - GENERAL: PAINLEVEL: NO PAIN (0)

## 2018-07-03 NOTE — PROGRESS NOTES
Return OB Visit    S: Patient has been feeling well. No ctx, VB or LOF. +FM.    O: /66 (BP Location: Right arm, Patient Position: Sitting, Cuff Size: Adult Large)  Pulse 88  Wt 75 kg (165 lb 7 oz)  LMP 10/07/2017  BMI 27.53 kg/m2  Gen: Well-appearing, NAD  See OB Flowsheet    A/P:  Romeo Funez is a 22 year old  at 38w4d by 7w5d US, here for return OB visit.  Family history of cleft palate: normal level II  Constipation: miralax  GERD: zantac   Social situation: stable to improved  Plans epidural, breastfeeding  Mirena      PNC:   - Prenatal labs reviewed, Rh positive, Rubella immune. GCT 75. GBS negative  - Genetics: negative SMA and CF screen. Quad normal  - Imaging: dating US at 7w5d. Normal anatomy  - Immunizations: influenza 2017. Tdap 2018   RTC weekly until delivery    Linh Guadalupe MD  OB/GYN  7/3/2018 9:08 AM

## 2018-07-03 NOTE — MR AVS SNAPSHOT
After Visit Summary   7/3/2018    Romeo Funez    MRN: [id number]           Patient Information     Date Of Birth          1995        Visit Information        Provider Department      7/3/2018 9:15 AM Linh Guadalupe MD [facility]        Today's Diagnoses     Encounter for supervision of normal first pregnancy in third trimester    -  1       Follow-ups after your visit        Your next 10 appointments already scheduled     Jul 11, 2018  9:15 AM CDT   ESTABLISHED PRENATAL with Linh Guadalupe MD   [facility] ([facility])    [address]  [place] [address]   [phone]            Jul 18, 2018  9:15 AM CDT   ESTABLISHED PRENATAL with Linh Guadalupe MD   [facility] ([facility])    [address]  [place] [address]   [phone]              Who to contact     If you have questions or need follow up information about today's clinic visit or your schedule please contact [facility] directly at [phone].  Normal or non-critical lab and imaging results will be communicated to you by "[employer]hart, letter or phone within 4 business days after the clinic has received the results. If you do not hear from us within 7 days, please contact the clinic through milogt or phone. If you have a critical or abnormal lab result, we will notify you by phone as soon as possible.  Submit refill requests through [employer] or call your pharmacy and they will forward the refill request to us. Please allow 3 business days for your refill to be completed.          Additional Information About Your Visit        "[employer]hart Information     [employer] gives you secure access to your electronic health record. If you see a primary care provider, you can also send messages to your care team and make appointments. If you have questions, please call your primary  [facility] clinic.  If you do not have a primary care provider, please call [phone] and they will assist you.        Care EveryWhere ID     This is your Care EveryWhere ID. This could be used by other organizations to access your [place] medical records  [id number]        Your Vitals Were     Pulse Last Period BMI (Body Mass Index)             88 10/07/2017 27.53 kg/m2          Blood Pressure from Last 3 Encounters:   07/03/18 122/66   06/27/18 128/76   06/20/18 136/82    Weight from Last 3 Encounters:   07/03/18 75 kg (165 lb 7 oz)   06/27/18 74.9 kg (165 lb 2 oz)   06/20/18 74.1 kg (163 lb 4.8 oz)              Today, you had the following     No orders found for display       Primary Care Provider Office Phone # Fax #    Linh Guadalupe [phone]       [address] [facility] [address]        Equal Access to Services     [facility]: Hadii aad ku hadasho Soomaali, waaxda luqadaha, qaybta kaalmada adeegyada, waxay idiin haysaharan dominik rios . So [facility] [phone].    ATENCIÓN: Si habla español, tiene a rodgers disposición servicios gratuitos de asistencia lingüística. Llame al [phone].    We comply with applicable federal civil rights laws and [place] laws. We do not discriminate on the basis of race, color, national origin, age, disability, sex, sexual orientation, or gender identity.            Thank you!     Thank you for choosing [facility] AND [facility]  for your care. Our goal is always to provide you with excellent care. Hearing back from our patients is one way we can continue to improve our services. Please take a few minutes to complete the written survey that you may receive in the mail after your visit with us. Thank you!             Your Updated Medication List - Protect others around you: Learn how to safely use, store and throw away your medicines at www.disposemymeds.org.          This list is accurate as of 7/3/18  9:31 AM.  Always use your  most recent med list.                   Brand Name Dispense Instructions for use Diagnosis    CVS PRENATAL 28-0.8 MG Tabs      Take 1 tablet by mouth daily        ketoconazole 2 % shampoo    NIZORAL    120 mL    Apply to the affected area and wash off after 5 minutes.    Tinea versicolor       polyethylene glycol powder    MIRALAX/GLYCOLAX     Take 17 g by mouth

## 2018-07-03 NOTE — NURSING NOTE
Patient presents today for her prenatal check-up. She is currently 38w4d.  Katya Rangel LPN  7/3/2018  9:10 AM

## 2018-07-04 ASSESSMENT — PATIENT HEALTH QUESTIONNAIRE - PHQ9: SUM OF ALL RESPONSES TO PHQ QUESTIONS 1-9: 0

## 2018-07-11 ENCOUNTER — PRENATAL OFFICE VISIT (OUTPATIENT)
Dept: OBGYN | Facility: OTHER | Age: 23
End: 2018-07-11
Attending: OBSTETRICS & GYNECOLOGY
Payer: COMMERCIAL

## 2018-07-11 VITALS
DIASTOLIC BLOOD PRESSURE: 70 MMHG | BODY MASS INDEX: 28.11 KG/M2 | WEIGHT: 168.9 LBS | HEART RATE: 92 BPM | SYSTOLIC BLOOD PRESSURE: 130 MMHG

## 2018-07-11 DIAGNOSIS — Z34.03 ENCOUNTER FOR SUPERVISION OF NORMAL FIRST PREGNANCY IN THIRD TRIMESTER: Primary | ICD-10-CM

## 2018-07-11 PROCEDURE — 99207 ZZC OB VISIT-NO CHARGE - GICH ONLY: CPT | Performed by: OBSTETRICS & GYNECOLOGY

## 2018-07-11 RX ORDER — TERBUTALINE SULFATE 1 MG/ML
0.25 INJECTION, SOLUTION SUBCUTANEOUS
Status: CANCELLED | OUTPATIENT
Start: 2018-07-11

## 2018-07-11 RX ORDER — ONDANSETRON 2 MG/ML
4 INJECTION INTRAMUSCULAR; INTRAVENOUS EVERY 6 HOURS PRN
Status: CANCELLED | OUTPATIENT
Start: 2018-07-11

## 2018-07-11 RX ORDER — OXYCODONE AND ACETAMINOPHEN 5; 325 MG/1; MG/1
1 TABLET ORAL
Status: CANCELLED | OUTPATIENT
Start: 2018-07-11

## 2018-07-11 RX ORDER — CARBOPROST TROMETHAMINE 250 UG/ML
250 INJECTION, SOLUTION INTRAMUSCULAR
Status: CANCELLED | OUTPATIENT
Start: 2018-07-11

## 2018-07-11 RX ORDER — METHYLERGONOVINE MALEATE 0.2 MG/ML
200 INJECTION INTRAVENOUS
Status: CANCELLED | OUTPATIENT
Start: 2018-07-11

## 2018-07-11 RX ORDER — SODIUM CHLORIDE, SODIUM LACTATE, POTASSIUM CHLORIDE, CALCIUM CHLORIDE 600; 310; 30; 20 MG/100ML; MG/100ML; MG/100ML; MG/100ML
INJECTION, SOLUTION INTRAVENOUS CONTINUOUS
Status: CANCELLED | OUTPATIENT
Start: 2018-07-11

## 2018-07-11 RX ORDER — NALOXONE HYDROCHLORIDE 0.4 MG/ML
.1-.4 INJECTION, SOLUTION INTRAMUSCULAR; INTRAVENOUS; SUBCUTANEOUS
Status: CANCELLED | OUTPATIENT
Start: 2018-07-11

## 2018-07-11 RX ORDER — IBUPROFEN 200 MG
800 TABLET ORAL
Status: CANCELLED | OUTPATIENT
Start: 2018-07-11

## 2018-07-11 RX ORDER — MISOPROSTOL 100 UG/1
25 TABLET ORAL ONCE
Status: CANCELLED | OUTPATIENT
Start: 2018-07-11 | End: 2018-07-11

## 2018-07-11 RX ORDER — ACETAMINOPHEN 325 MG/1
650 TABLET ORAL EVERY 4 HOURS PRN
Status: CANCELLED | OUTPATIENT
Start: 2018-07-11

## 2018-07-11 RX ORDER — OXYTOCIN 10 [USP'U]/ML
10 INJECTION, SOLUTION INTRAMUSCULAR; INTRAVENOUS
Status: CANCELLED | OUTPATIENT
Start: 2018-07-11

## 2018-07-11 ASSESSMENT — PAIN SCALES - GENERAL: PAINLEVEL: NO PAIN (0)

## 2018-07-11 NOTE — NURSING NOTE
Patient presents to theclinic for Ob check at 39 weeks  and 5 days. She reports no new concerns.  Ankita Snowden........................7/11/2018  9:34 AM

## 2018-07-11 NOTE — PROGRESS NOTES
Return OB Visit    S: Patient is feeling good. No ctx, VB or LOF. +FM    O: /70 (BP Location: Right arm, Patient Position: Sitting, Cuff Size: Adult Regular)  Pulse 92  Wt 76.6 kg (168 lb 14.4 oz)  LMP 10/07/2017  BMI 28.11 kg/m2  Gen: Well-appearing, NAD  See OB Flowsheet    A/P:  Romeo Funez is a 22 year old  at 39w5d by 7w5d US, here for return OB visit.  Family history of cleft palate: normal level II  Constipation: miralax  GERD: zantac   Social situation: stable to improved  Plans epidural, breastfeeding  Mirena      PNC:   - Prenatal labs reviewed, Rh positive, Rubella immune. GCT 75. GBS negative  - Genetics: negative SMA and CF screen. Quad normal  - Imaging: dating US at 7w5d. Normal anatomy  - Immunizations: influenza 2017. Tdap 2018   IOL scheduled  in PM for ripening    Linh Guadalupe MD  OB/GYN  2018 9:45 AM

## 2018-07-11 NOTE — MR AVS SNAPSHOT
After Visit Summary   7/11/2018    Romeo Funez    MRN: [id number]           Patient Information     Date Of Birth          1995        Visit Information        Provider Department      7/11/2018 9:15 AM Linh Guadalupe MD [facility]        Today's Diagnoses     Encounter for supervision of normal first pregnancy in third trimester    -  1       Follow-ups after your visit        Your next 10 appointments already scheduled     Jul 18, 2018  9:15 AM CDT   ESTABLISHED PRENATAL with Linh Guadalupe MD   [facility]    [address]  [place] [address] [phone]              Who to contact     If you have questions or need follow up information about today's clinic visit or your schedule please contact [facility] directly at [phone].  Normal or non-critical lab and imaging results will be communicated to you by [employer], letter or phone within 4 business days after the clinic has received the results. If you do not hear from us within 7 days, please contact the clinic through [employer] or phone. If you have a critical or abnormal lab result, we will notify you by phone as soon as possible.  Submit refill requests through [employer] or call your pharmacy and they will forward the refill request to us. Please allow 3 business days for your refill to be completed.          Additional Information About Your Visit        MyChart Information     [employer] gives you secure access to your electronic health record. If you see a primary care provider, you can also send messages to your care team and make appointments. If you have questions, please call your primary care clinic.  If you do not have a primary care provider, please call [phone] and they will assist you.        Care EveryWhere ID     This is your Care EveryWhere ID. This could be used by other organizations to  access your [facility] medical records  [id number]        Your Vitals Were     Pulse Last Period BMI (Body Mass Index)             92 10/07/2017 28.11 kg/m2          Blood Pressure from Last 3 Encounters:   07/11/18 130/70   07/03/18 122/66   06/27/18 128/76    Weight from Last 3 Encounters:   07/11/18 76.6 kg (168 lb 14.4 oz)   07/03/18 75 kg (165 lb 7 oz)   06/27/18 74.9 kg (165 lb 2 oz)              Today, you had the following     No orders found for display       Primary Care Provider Office Phone # Fax #    Linh Guadalupe [phone] [address] [place] [place] [address]        Equal Access to Services     ZBIGNIEW JOHNSON : Hadii sj rutherfordo Soodalys, waaxda luqadaha, qaybta kaalmada adeegyada, aletha rios . So [facility] [phone].    ATENCIÓN: Si habla español, tiene a rodgers disposición servicios gratuitos de asistencia lingüística. Llame al [phone].    We comply with applicable federal civil rights laws and [place] laws. We do not discriminate on the basis of race, color, national origin, age, disability, sex, sexual orientation, or gender identity.            Thank you!     Thank you for choosing [facility] hospitals  for your care. Our goal is always to provide you with excellent care. Hearing back from our patients is one way we can continue to improve our services. Please take a few minutes to complete the written survey that you may receive in the mail after your visit with us. Thank you!             Your Updated Medication List - Protect others around you: Learn how to safely use, store and throw away your medicines at www.disposemymeds.org.          This list is accurate as of 7/11/18  9:59 AM.  Always use your most recent med list.                   Brand Name Dispense Instructions for use Diagnosis    [employer]S PRENATAL 28-0.8 MG Tabs      Take 1 tablet by mouth daily        ketoconazole 2 % shampoo    NIZORAL    120 mL    Apply  to the affected area and wash off after 5 minutes.    Tinea versicolor       polyethylene glycol powder    MIRALAX/GLYCOLAX     Take 17 g by mouth

## 2018-07-13 ENCOUNTER — HOSPITAL ENCOUNTER (INPATIENT)
Facility: OTHER | Age: 23
LOS: 3 days | Discharge: HOME | End: 2018-07-16
Attending: OBSTETRICS & GYNECOLOGY | Admitting: OBSTETRICS & GYNECOLOGY
Payer: COMMERCIAL

## 2018-07-13 ENCOUNTER — ANESTHESIA EVENT (OUTPATIENT)
Dept: OBGYN | Facility: OTHER | Age: 23
End: 2018-07-13
Payer: COMMERCIAL

## 2018-07-13 ENCOUNTER — ANESTHESIA (OUTPATIENT)
Dept: OBGYN | Facility: OTHER | Age: 23
End: 2018-07-13
Payer: COMMERCIAL

## 2018-07-13 LAB
ABO + RH BLD: NORMAL
ABO + RH BLD: NORMAL
BASOPHILS # BLD AUTO: 0 10E9/L (ref 0–0.2)
BASOPHILS NFR BLD AUTO: 0.2 %
BLD GP AB SCN SERPL QL: NORMAL
BLOOD BANK CMNT PATIENT-IMP: NORMAL
DIFFERENTIAL METHOD BLD: ABNORMAL
EOSINOPHIL # BLD AUTO: 0 10E9/L (ref 0–0.7)
EOSINOPHIL NFR BLD AUTO: 0.4 %
ERYTHROCYTE [DISTWIDTH] IN BLOOD BY AUTOMATED COUNT: 12.2 % (ref 10–15)
HCT VFR BLD AUTO: 36.7 % (ref 35–47)
HGB BLD-MCNC: 12.5 G/DL (ref 11.7–15.7)
IMM GRANULOCYTES # BLD: 0 10E9/L (ref 0–0.4)
IMM GRANULOCYTES NFR BLD: 0.4 %
LYMPHOCYTES # BLD AUTO: 1.6 10E9/L (ref 0.8–5.3)
LYMPHOCYTES NFR BLD AUTO: 14.9 %
MCH RBC QN AUTO: 29.7 PG (ref 26.5–33)
MCHC RBC AUTO-ENTMCNC: 34.1 G/DL (ref 31.5–36.5)
MCV RBC AUTO: 87 FL (ref 78–100)
MONOCYTES # BLD AUTO: 0.5 10E9/L (ref 0–1.3)
MONOCYTES NFR BLD AUTO: 4.8 %
NEUTROPHILS # BLD AUTO: 8.7 10E9/L (ref 1.6–8.3)
NEUTROPHILS NFR BLD AUTO: 79.3 %
PLATELET # BLD AUTO: 122 10E9/L (ref 150–450)
RBC # BLD AUTO: 4.21 10E12/L (ref 3.8–5.2)
SPECIMEN EXP DATE BLD: NORMAL
WBC # BLD AUTO: 11 10E9/L (ref 4–11)

## 2018-07-13 PROCEDURE — 25000125 ZZHC RX 250: Performed by: NURSE ANESTHETIST, CERTIFIED REGISTERED

## 2018-07-13 PROCEDURE — 86780 TREPONEMA PALLIDUM: CPT | Performed by: OBSTETRICS & GYNECOLOGY

## 2018-07-13 PROCEDURE — 37000011 ZZH ANESTHESIA WARD SERVICE

## 2018-07-13 PROCEDURE — G0463 HOSPITAL OUTPT CLINIC VISIT: HCPCS

## 2018-07-13 PROCEDURE — 59400 OBSTETRICAL CARE: CPT | Performed by: NURSE ANESTHETIST, CERTIFIED REGISTERED

## 2018-07-13 PROCEDURE — 25000128 H RX IP 250 OP 636: Performed by: NURSE ANESTHETIST, CERTIFIED REGISTERED

## 2018-07-13 PROCEDURE — 36415 COLL VENOUS BLD VENIPUNCTURE: CPT | Performed by: OBSTETRICS & GYNECOLOGY

## 2018-07-13 PROCEDURE — 25000128 H RX IP 250 OP 636: Performed by: OBSTETRICS & GYNECOLOGY

## 2018-07-13 PROCEDURE — 85025 COMPLETE CBC W/AUTO DIFF WBC: CPT | Performed by: OBSTETRICS & GYNECOLOGY

## 2018-07-13 PROCEDURE — 12000027 ZZH R&B OB

## 2018-07-13 PROCEDURE — 86850 RBC ANTIBODY SCREEN: CPT | Performed by: OBSTETRICS & GYNECOLOGY

## 2018-07-13 PROCEDURE — 86901 BLOOD TYPING SEROLOGIC RH(D): CPT | Performed by: OBSTETRICS & GYNECOLOGY

## 2018-07-13 PROCEDURE — 86900 BLOOD TYPING SEROLOGIC ABO: CPT | Performed by: OBSTETRICS & GYNECOLOGY

## 2018-07-13 PROCEDURE — 10907ZC DRAINAGE OF AMNIOTIC FLUID, THERAPEUTIC FROM PRODUCTS OF CONCEPTION, VIA NATURAL OR ARTIFICIAL OPENING: ICD-10-PCS | Performed by: OBSTETRICS & GYNECOLOGY

## 2018-07-13 RX ORDER — ONDANSETRON 2 MG/ML
4 INJECTION INTRAMUSCULAR; INTRAVENOUS EVERY 6 HOURS PRN
Status: DISCONTINUED | OUTPATIENT
Start: 2018-07-13 | End: 2018-07-14

## 2018-07-13 RX ORDER — NALOXONE HYDROCHLORIDE 0.4 MG/ML
.1-.4 INJECTION, SOLUTION INTRAMUSCULAR; INTRAVENOUS; SUBCUTANEOUS
Status: DISCONTINUED | OUTPATIENT
Start: 2018-07-13 | End: 2018-07-14

## 2018-07-13 RX ORDER — ACETAMINOPHEN 325 MG/1
650 TABLET ORAL EVERY 4 HOURS PRN
Status: DISCONTINUED | OUTPATIENT
Start: 2018-07-13 | End: 2018-07-14

## 2018-07-13 RX ORDER — CARBOPROST TROMETHAMINE 250 UG/ML
250 INJECTION, SOLUTION INTRAMUSCULAR
Status: DISCONTINUED | OUTPATIENT
Start: 2018-07-13 | End: 2018-07-14

## 2018-07-13 RX ORDER — NALBUPHINE HYDROCHLORIDE 10 MG/ML
2.5-5 INJECTION, SOLUTION INTRAMUSCULAR; INTRAVENOUS; SUBCUTANEOUS EVERY 6 HOURS PRN
Status: DISCONTINUED | OUTPATIENT
Start: 2018-07-13 | End: 2018-07-14

## 2018-07-13 RX ORDER — OXYCODONE AND ACETAMINOPHEN 5; 325 MG/1; MG/1
1 TABLET ORAL
Status: DISCONTINUED | OUTPATIENT
Start: 2018-07-13 | End: 2018-07-14

## 2018-07-13 RX ORDER — LIDOCAINE HYDROCHLORIDE 10 MG/ML
INJECTION, SOLUTION INFILTRATION; PERINEURAL PRN
Status: DISCONTINUED | OUTPATIENT
Start: 2018-07-13 | End: 2018-07-14

## 2018-07-13 RX ORDER — PHENYLEPHRINE HCL IN 0.9% NACL 1 MG/10 ML
100 SYRINGE (ML) INTRAVENOUS EVERY 5 MIN PRN
Status: DISCONTINUED | OUTPATIENT
Start: 2018-07-13 | End: 2018-07-14

## 2018-07-13 RX ORDER — OXYTOCIN 10 [USP'U]/ML
10 INJECTION, SOLUTION INTRAMUSCULAR; INTRAVENOUS
Status: DISCONTINUED | OUTPATIENT
Start: 2018-07-13 | End: 2018-07-14

## 2018-07-13 RX ORDER — LIDOCAINE HYDROCHLORIDE AND EPINEPHRINE 15; 5 MG/ML; UG/ML
INJECTION, SOLUTION EPIDURAL PRN
Status: DISCONTINUED | OUTPATIENT
Start: 2018-07-13 | End: 2018-07-14

## 2018-07-13 RX ORDER — IBUPROFEN 400 MG/1
800 TABLET, FILM COATED ORAL
Status: DISCONTINUED | OUTPATIENT
Start: 2018-07-13 | End: 2018-07-14

## 2018-07-13 RX ORDER — METHYLERGONOVINE MALEATE 0.2 MG/ML
200 INJECTION INTRAVENOUS
Status: DISCONTINUED | OUTPATIENT
Start: 2018-07-13 | End: 2018-07-14

## 2018-07-13 RX ORDER — SODIUM CHLORIDE, SODIUM LACTATE, POTASSIUM CHLORIDE, CALCIUM CHLORIDE 600; 310; 30; 20 MG/100ML; MG/100ML; MG/100ML; MG/100ML
INJECTION, SOLUTION INTRAVENOUS CONTINUOUS
Status: DISCONTINUED | OUTPATIENT
Start: 2018-07-13 | End: 2018-07-14

## 2018-07-13 RX ADMIN — SODIUM CHLORIDE, POTASSIUM CHLORIDE, SODIUM LACTATE AND CALCIUM CHLORIDE: 600; 310; 30; 20 INJECTION, SOLUTION INTRAVENOUS at 17:05

## 2018-07-13 RX ADMIN — LIDOCAINE HYDROCHLORIDE 30 MG: 10 INJECTION, SOLUTION INFILTRATION; PERINEURAL at 18:15

## 2018-07-13 RX ADMIN — Medication 10 ML/HR: at 18:29

## 2018-07-13 RX ADMIN — LIDOCAINE HYDROCHLORIDE 30 MG: 10 INJECTION, SOLUTION INFILTRATION; PERINEURAL at 18:08

## 2018-07-13 RX ADMIN — SODIUM CHLORIDE, SODIUM LACTATE, POTASSIUM CHLORIDE, AND CALCIUM CHLORIDE 1000 ML: 600; 310; 30; 20 INJECTION, SOLUTION INTRAVENOUS at 17:57

## 2018-07-13 RX ADMIN — LIDOCAINE HYDROCHLORIDE AND EPINEPHRINE 75 MG: 15; 5 INJECTION, SOLUTION EPIDURAL at 18:15

## 2018-07-13 RX ADMIN — SODIUM CHLORIDE, POTASSIUM CHLORIDE, SODIUM LACTATE AND CALCIUM CHLORIDE: 600; 310; 30; 20 INJECTION, SOLUTION INTRAVENOUS at 17:57

## 2018-07-13 ASSESSMENT — LIFESTYLE VARIABLES: TOBACCO_USE: 1

## 2018-07-13 NOTE — ANESTHESIA PROCEDURE NOTES
Peripheral nerve/Neuraxial procedure note : epidural catheter  Pre-Procedure  Performed by  CONCEPCION CAZARES   Location: OB    Procedure Times:7/13/2018 6:08 PM and 7/13/2018 6:28 PM  Pre-Anesthestic Checklist: patient identified, IV checked, site marked, risks and benefits discussed, informed consent, monitors and equipment checked, pre-op evaluation, at physician/surgeon's request and post-op pain management    Timeout  Correct Patient: Yes   Correct Procedure: Yes   Correct Site: Yes   Correct Laterality: Yes   Correct Position: Yes   Site Marked: Yes   .   Procedure Documentation    Diagnosis:labor pain.    Procedure:    Epidural catheter.  Insertion Site:L3-4  (midline approach) Injection technique: LORT air   Local skin infiltrated with mL of 1% lidocaine.  CONCHIS at 5 cm     Patient Prep;chlorhexidine gluconate and isopropyl alcohol.  .  Needle: Touhy needle Needle Gauge: 17.    Needle Length (Inches) 3.5  # of attempts: 1 and # of redirects:  .   Catheter: 20 G . .  Catheter threaded easily  5 cm epidural space.  10 cm at skin.   .    Assessment/Narrative  Paresthesias: No.  .  .  Aspiration negative for heme or CSF  . Test dose of 5 mL lidocaine 1.5% w/ 1:200,000 epinephrine at 16:15.  Test dose negative for signs of intravascular, subdural or intrathecal injection.

## 2018-07-13 NOTE — ANESTHESIA PREPROCEDURE EVALUATION
Anesthesia Evaluation     . Pt has had prior anesthetic. Type: General           ROS/MED HX    ENT/Pulmonary:  - neg pulmonary ROS   (+)tobacco use, , . .    Neurologic:  - neg neurologic ROS     Cardiovascular:  - neg cardiovascular ROS       METS/Exercise Tolerance:  >4 METS   Hematologic:  - neg hematologic  ROS       Musculoskeletal:  - neg musculoskeletal ROS       GI/Hepatic:  - neg GI/hepatic ROS       Renal/Genitourinary:  - ROS Renal section negative       Endo:  - neg endo ROS       Psychiatric:  - neg psychiatric ROS       Infectious Disease:  - neg infectious disease ROS       Malignancy:      - no malignancy   Other:    (+) Possibly pregnant                    Physical Exam  Normal systems: cardiovascular, pulmonary and dental    Airway   Mallampati: II  TM distance: > 3 FB  Neck ROM: full  Mouth opening: > 3 cm    Dental     Cardiovascular   Rhythm and rate: regular and normal      Pulmonary    breath sounds clear to auscultation          neg OB ROS                 Anesthesia Plan      History & Physical Review      ASA Status:  .  OB Epidural Asa: 2            Postoperative Care      Consents  Anesthetic plan, risks, benefits and alternatives discussed with:  Patient..                          .

## 2018-07-13 NOTE — IP AVS SNAPSHOT
[facility]    [address]    [place] [address]    Phone:  [phone]    Fax:  [phone]                                       After Visit Summary   7/13/2018    Romeo Funez    MRN: [id number]           After Visit Summary Signature Page     I have received my discharge instructions, and my questions have been answered. I have discussed any challenges I see with this plan with the nurse or doctor.    ..........................................................................................................................................  Patient/Patient Representative Signature      ..........................................................................................................................................  Patient Representative Print Name and Relationship to Patient    ..................................................               ................................................  Date                                            Time    ..........................................................................................................................................  Reviewed by Signature/Title    ...................................................              ..............................................  Date                                                            Time

## 2018-07-13 NOTE — IP AVS SNAPSHOT
MRN:[id number]                      After Visit Summary   2018    Romeo Funez    MRN: [id number]           Thank you!     Thank you for choosing [facility] for your care. Our goal is always to provide you with excellent care. Hearing back from our patients is one way we can continue to improve our services. Please take a few minutes to complete the written survey that you may receive in the mail after you visit with us. Thank you!        Patient Information     Date Of Birth          1995        Designated Caregiver       Most Recent Value    Caregiver    Will someone help with your care after discharge? yes    Name of designated caregiver Darvin    Phone number of caregiver same as patient    Caregiver address same as patient      About your hospital stay     You were admitted on:  2018 You last received care in the:  [facility]    You were discharged on:  2018        Reason for your hospital stay       Maternity care                  Who to Call     For medical emergencies, please call 911.  For non-urgent questions about your medical care, please call your primary care provider or clinic, [phone]          Attending Provider     Provider Specialty    Linh Guadalupe MD OB/Gyn       Primary Care Provider Office Phone # Fax #    Linh Guadalupe [phone]      After Care Instructions     Activity       Review discharge instructions            Diet       Resume previous diet            Discharge Instructions - Postpartum visit       Schedule postpartum visit with your provider and return to clinic in 6 weeks. Also 2 weeks if a  section was done.                  Follow-up Appointments     Follow Up and recommended labs and tests       Follow up with me,  Regulo Maldonado, within 6 weeks. for hospital follow- up.  No follow up labs or test are needed.                  Your next 10 appointments already  scheduled     Aug 28, 2018 11:15 AM CDT   Post Partum with Lihn Guadalupe MD   [facility] ([facility])    [address]  [place] [address] [phone]              Pending Results     Date and Time Order Name Status Description    7/13/2018 1720 Treponema Abs w Reflex to RPR and Titer In process             Statement of Approval     Ordered          07/16/18 0737  I have reviewed and agree with all the recommendations and orders detailed in this document.  EFFECTIVE NOW     Approved and electronically signed by:  Regulo Maldonado MD             Admission Information     Date & Time Provider Department Dept. Phone    7/13/2018 Linh Guadalupe MD [facility] [phone]      Your Vitals Were     Blood Pressure Pulse Temperature Respirations Last Period Pulse Oximetry    136/65 79 97.6  F (36.4  C) (Oral) 16 10/07/2017 99%      MyChart Information     [employer] gives you secure access to your electronic health record. If you see a primary care provider, you can also send messages to your care team and make appointments. If you have questions, please call your primary care clinic.  If you do not have a primary care provider, please call [phone] and they will assist you.        Care EveryWhere ID     This is your Care EveryWhere ID. This could be used by other organizations to access your [facility] medical records  [id number]        Equal Access to Services     RAYRAY JOHNSON : Hadii sj Raphael, waaxda marlene, qaybta aletha ahuja . So [facility] [phone].    ATENCIÓN: Si habla español, tiene a rodgers disposición servicios gratuitos de asistencia lingüística. Llame al [phone].    We comply with applicable federal civil rights laws and [place] laws. We do not discriminate on the basis of race, color, national origin, age, disability, sex, sexual orientation, or gender  identity.               Review of your medicines      START taking        Dose / Directions    breast pump Misc        Dose:  1 each   1 each as needed   Quantity:  1 each   Refills:  0       ibuprofen 600 MG tablet   Commonly known as:  ADVIL/MOTRIN        Dose:  600 mg   Take 1 tablet (600 mg) by mouth every 6 hours as needed for other (cramping)   Quantity:  30 tablet   Refills:  0       senna-docusate 8.6-50 MG per tablet   Commonly known as:  SENOKOT-S;PERICOLACE        Dose:  1 tablet   Take 1 tablet by mouth 2 times daily   Quantity:  30 tablet   Refills:  0         CONTINUE these medicines which have NOT CHANGED        Dose / Directions    CVS PRENATAL 28-0.8 MG Tabs        Dose:  1 tablet   Take 1 tablet by mouth daily   Refills:  0       ketoconazole 2 % shampoo   Commonly known as:  NIZORAL   Used for:  Tinea versicolor        Apply to the affected area and wash off after 5 minutes.   Quantity:  120 mL   Refills:  1       polyethylene glycol powder   Commonly known as:  MIRALAX/GLYCOLAX        Dose:  17 g   Take 17 g by mouth   Refills:  0            Where to get your medicines      These medications were sent to [facility] [address] [address]     Phone:  [phone]     ibuprofen 600 MG tablet    senna-docusate 8.6-50 MG per tablet         Some of these will need a paper prescription and others can be bought over the counter. Ask your nurse if you have questions.     Bring a paper prescription for each of these medications     breast pump Misc                Protect others around you: Learn how to safely use, store and throw away your medicines at www.disposemymeds.org.             Medication List: This is a list of all your medications and when to take them. Check marks below indicate your daily home schedule. Keep this list as a reference.      Medications           Morning Afternoon Evening Bedtime As Needed    breast  pump Misc   1 each as needed                                CVS PRENATAL 28-0.8 MG Tabs   Take 1 tablet by mouth daily                                ibuprofen 600 MG tablet   Commonly known as:  ADVIL/MOTRIN   Take 1 tablet (600 mg) by mouth every 6 hours as needed for other (cramping)   Last time this was given:  800 mg on 7/15/2018  8:10 PM                                ketoconazole 2 % shampoo   Commonly known as:  NIZORAL   Apply to the affected area and wash off after 5 minutes.                                polyethylene glycol powder   Commonly known as:  MIRALAX/GLYCOLAX   Take 17 g by mouth                                senna-docusate 8.6-50 MG per tablet   Commonly known as:  SENOKOT-S;PERICOLACE   Take 1 tablet by mouth 2 times daily   Last time this was given:  2 tablets on 7/16/2018 11:37 AM

## 2018-07-13 NOTE — PROGRESS NOTES
Admitted to room 403 in active labor at 40 weeks gestation. Rates contraction pain at 6/10 and asking for epidural on arrival to OB.

## 2018-07-13 NOTE — H&P
[facility] Labor and Delivery History and Physical    Romeo Funez MRN# [id number]   Age: 22 year old YOB: 1995     Date of Admission:  2018    Primary care provider: Linh Guadalupe           Chief Complaint:   Romeo Funez is a 22 year old  at 40w0d by 7w5d US admitted for spontaneous labor. She reports onset of contractions yesterday morning, increased in frequency and intensity this morning, became painful this afternoon. No VB or LOF.          Pregnancy history:     OBSTETRIC HISTORY:    Obstetric History       T0      L0     SAB0   TAB0   Ectopic0   Multiple0   Live Births0       # Outcome Date GA Lbr Ruddy/2nd Weight Sex Delivery Anes PTL Lv   1 Current                   EDC: Estimated Date of Delivery: 2018    Prenatal Labs:   Lab Results   Component Value Date    ABO B 2018    RH Pos 2018    AS Neg 2018    CHPCRT  2017     Negative   Negative for C. trachomatis rRNA by transcription mediated amplification.   A negative result by transcription mediated amplification does not preclude the   presence of C. trachomatis infection because results are dependent on proper   and adequate collection, absence of inhibitors, and sufficient rRNA to be   detected.      TREPAB Negative 2018    HGB 12.5 2018       GBS Status: negative    Active Problem List  Patient Active Problem List   Diagnosis     Normal labor and delivery       Medication Prior to Admission  Prescriptions Prior to Admission   Medication Sig Dispense Refill Last Dose     ketoconazole (NIZORAL) 2 % shampoo Apply to the affected area and wash off after 5 minutes. 120 mL 1      polyethylene glycol (MIRALAX/GLYCOLAX) powder Take 17 g by mouth   Not Taking     Prenatal Vit-Fe Fumarate-FA (CVS PRENATAL) 28-0.8 MG TABS Take 1 tablet by mouth daily      .        Maternal Past Medical History:   No past medical history on  file.  History reviewed. No pertinent surgical history.                    Family History:   History reviewed. No pertinent family history.              Social History:     Social History   Substance Use Topics     Smoking status: Current Every Day Smoker     Packs/day: 0.10     Types: Cigarettes     Start date: 3/29/2018     Smokeless tobacco: Never Used      Comment: couple a day, is planning on quitting 18     Alcohol use No            Review of Systems:   The Review of Systems is negative other than noted in the HPI          Physical Exam:   No data found.    Gen: resting in bed, NAD  CV: RRR  Resp: CTAB  Abd: gravid, soft, nontender  Ext: nontender    Cervix: 4/90/-2  Membranes: AROM- thin meconium  EFW: 7.5 lbs  Presentation:Cephalic    Fetal Heart Rate Tracin, mod variability, + accels, no decels  Tocometer: 2 ctx in 10 min        Assessment:   Romeo Funez is a 22 year old  at 40w0d admitted with spontaneous labor.           Plan:   FWB: Cat I, reactive. EFW 7.5 lbs  Labor: spontaneous progress, now s/p AROM.   Rh positive, RI, GBS negative  Continue routine labor management  Anticipate     Linh Guadalupe MD

## 2018-07-14 PROBLEM — O48.0 POST-DATES PREGNANCY: Status: ACTIVE | Noted: 2018-07-14

## 2018-07-14 LAB
ERYTHROCYTE [DISTWIDTH] IN BLOOD BY AUTOMATED COUNT: 12.4 % (ref 10–15)
HCT VFR BLD AUTO: 32.4 % (ref 35–47)
HGB BLD-MCNC: 11.1 G/DL (ref 11.7–15.7)
MCH RBC QN AUTO: 29.8 PG (ref 26.5–33)
MCHC RBC AUTO-ENTMCNC: 34.3 G/DL (ref 31.5–36.5)
MCV RBC AUTO: 87 FL (ref 78–100)
PLATELET # BLD AUTO: 120 10E9/L (ref 150–450)
RBC # BLD AUTO: 3.73 10E12/L (ref 3.8–5.2)
WBC # BLD AUTO: 13.9 10E9/L (ref 4–11)

## 2018-07-14 PROCEDURE — 72200001 ZZH LABOR CARE VAGINAL DELIVERY SINGLE

## 2018-07-14 PROCEDURE — 85027 COMPLETE CBC AUTOMATED: CPT | Performed by: OBSTETRICS & GYNECOLOGY

## 2018-07-14 PROCEDURE — 25000132 ZZH RX MED GY IP 250 OP 250 PS 637: Performed by: OBSTETRICS & GYNECOLOGY

## 2018-07-14 PROCEDURE — 12000027 ZZH R&B OB

## 2018-07-14 PROCEDURE — 59400 OBSTETRICAL CARE: CPT | Performed by: OBSTETRICS & GYNECOLOGY

## 2018-07-14 PROCEDURE — 36415 COLL VENOUS BLD VENIPUNCTURE: CPT | Performed by: OBSTETRICS & GYNECOLOGY

## 2018-07-14 RX ORDER — CARBOPROST TROMETHAMINE 250 UG/ML
250 INJECTION, SOLUTION INTRAMUSCULAR
Status: DISCONTINUED | OUTPATIENT
Start: 2018-07-14 | End: 2018-07-14

## 2018-07-14 RX ORDER — LANOLIN 100 %
OINTMENT (GRAM) TOPICAL
Status: DISCONTINUED | OUTPATIENT
Start: 2018-07-14 | End: 2018-07-16 | Stop reason: HOSPADM

## 2018-07-14 RX ORDER — HYDROCORTISONE 2.5 %
CREAM (GRAM) TOPICAL 3 TIMES DAILY PRN
Status: DISCONTINUED | OUTPATIENT
Start: 2018-07-14 | End: 2018-07-16 | Stop reason: HOSPADM

## 2018-07-14 RX ORDER — TERBUTALINE SULFATE 1 MG/ML
0.25 INJECTION, SOLUTION SUBCUTANEOUS
Status: DISCONTINUED | OUTPATIENT
Start: 2018-07-14 | End: 2018-07-14

## 2018-07-14 RX ORDER — ONDANSETRON 2 MG/ML
4 INJECTION INTRAMUSCULAR; INTRAVENOUS EVERY 6 HOURS PRN
Status: DISCONTINUED | OUTPATIENT
Start: 2018-07-14 | End: 2018-07-14

## 2018-07-14 RX ORDER — ACETAMINOPHEN 325 MG/1
650 TABLET ORAL EVERY 4 HOURS PRN
Status: DISCONTINUED | OUTPATIENT
Start: 2018-07-14 | End: 2018-07-16 | Stop reason: HOSPADM

## 2018-07-14 RX ORDER — IBUPROFEN 400 MG/1
800 TABLET, FILM COATED ORAL
Status: DISCONTINUED | OUTPATIENT
Start: 2018-07-14 | End: 2018-07-14

## 2018-07-14 RX ORDER — MISOPROSTOL 100 UG/1
400 TABLET ORAL
Status: DISCONTINUED | OUTPATIENT
Start: 2018-07-14 | End: 2018-07-14 | Stop reason: CLARIF

## 2018-07-14 RX ORDER — OXYTOCIN 10 [USP'U]/ML
10 INJECTION, SOLUTION INTRAMUSCULAR; INTRAVENOUS
Status: DISCONTINUED | OUTPATIENT
Start: 2018-07-14 | End: 2018-07-14

## 2018-07-14 RX ORDER — SODIUM CHLORIDE, SODIUM LACTATE, POTASSIUM CHLORIDE, CALCIUM CHLORIDE 600; 310; 30; 20 MG/100ML; MG/100ML; MG/100ML; MG/100ML
INJECTION, SOLUTION INTRAVENOUS CONTINUOUS
Status: DISCONTINUED | OUTPATIENT
Start: 2018-07-14 | End: 2018-07-14

## 2018-07-14 RX ORDER — IBUPROFEN 400 MG/1
800 TABLET, FILM COATED ORAL EVERY 6 HOURS PRN
Status: DISCONTINUED | OUTPATIENT
Start: 2018-07-14 | End: 2018-07-16 | Stop reason: HOSPADM

## 2018-07-14 RX ORDER — ACETAMINOPHEN 325 MG/1
650 TABLET ORAL EVERY 4 HOURS PRN
Status: DISCONTINUED | OUTPATIENT
Start: 2018-07-14 | End: 2018-07-14

## 2018-07-14 RX ORDER — NALOXONE HYDROCHLORIDE 0.4 MG/ML
.1-.4 INJECTION, SOLUTION INTRAMUSCULAR; INTRAVENOUS; SUBCUTANEOUS
Status: DISCONTINUED | OUTPATIENT
Start: 2018-07-14 | End: 2018-07-14

## 2018-07-14 RX ORDER — OXYTOCIN 10 [USP'U]/ML
10 INJECTION, SOLUTION INTRAMUSCULAR; INTRAVENOUS
Status: DISCONTINUED | OUTPATIENT
Start: 2018-07-14 | End: 2018-07-16 | Stop reason: HOSPADM

## 2018-07-14 RX ORDER — METHYLERGONOVINE MALEATE 0.2 MG/ML
200 INJECTION INTRAVENOUS
Status: DISCONTINUED | OUTPATIENT
Start: 2018-07-14 | End: 2018-07-14

## 2018-07-14 RX ORDER — OXYCODONE AND ACETAMINOPHEN 5; 325 MG/1; MG/1
1 TABLET ORAL
Status: DISCONTINUED | OUTPATIENT
Start: 2018-07-14 | End: 2018-07-14

## 2018-07-14 RX ORDER — NALOXONE HYDROCHLORIDE 0.4 MG/ML
.1-.4 INJECTION, SOLUTION INTRAMUSCULAR; INTRAVENOUS; SUBCUTANEOUS
Status: DISCONTINUED | OUTPATIENT
Start: 2018-07-14 | End: 2018-07-16 | Stop reason: HOSPADM

## 2018-07-14 RX ORDER — OXYCODONE HYDROCHLORIDE 5 MG/1
5 TABLET ORAL EVERY 4 HOURS PRN
Status: DISCONTINUED | OUTPATIENT
Start: 2018-07-14 | End: 2018-07-16 | Stop reason: HOSPADM

## 2018-07-14 RX ORDER — AMOXICILLIN 250 MG
1 CAPSULE ORAL 2 TIMES DAILY
Status: DISCONTINUED | OUTPATIENT
Start: 2018-07-14 | End: 2018-07-16 | Stop reason: HOSPADM

## 2018-07-14 RX ORDER — MISOPROSTOL 100 UG/1
25 TABLET ORAL ONCE
Status: DISCONTINUED | OUTPATIENT
Start: 2018-07-14 | End: 2018-07-14

## 2018-07-14 RX ORDER — BISACODYL 10 MG
10 SUPPOSITORY, RECTAL RECTAL DAILY PRN
Status: DISCONTINUED | OUTPATIENT
Start: 2018-07-16 | End: 2018-07-16 | Stop reason: HOSPADM

## 2018-07-14 RX ORDER — AMOXICILLIN 250 MG
2 CAPSULE ORAL 2 TIMES DAILY
Status: DISCONTINUED | OUTPATIENT
Start: 2018-07-14 | End: 2018-07-16 | Stop reason: HOSPADM

## 2018-07-14 RX ADMIN — IBUPROFEN 800 MG: 400 TABLET ORAL at 15:49

## 2018-07-14 RX ADMIN — DOCUSATE SODIUM AND SENNOSIDES 1 TABLET: 8.6; 5 TABLET, FILM COATED ORAL at 11:09

## 2018-07-14 ASSESSMENT — ACTIVITIES OF DAILY LIVING (ADL)
TOILETING: 0-->INDEPENDENT
TRANSFERRING: 0-->INDEPENDENT
COGNITION: 0 - NO COGNITION ISSUES REPORTED
BATHING: 0-->INDEPENDENT
RETIRED_EATING: 0-->INDEPENDENT
SWALLOWING: 0-->SWALLOWS FOODS/LIQUIDS WITHOUT DIFFICULTY
RETIRED_COMMUNICATION: 0-->UNDERSTANDS/COMMUNICATES WITHOUT DIFFICULTY
FALL_HISTORY_WITHIN_LAST_SIX_MONTHS: NO
AMBULATION: 0-->INDEPENDENT
DRESS: 0-->INDEPENDENT

## 2018-07-14 NOTE — PLAN OF CARE
Problem: Postpartum (Vaginal Delivery) (Adult,Obstetrics,Pediatric)  Goal: Signs and Symptoms of Listed Potential Problems Will be Absent, Minimized or Managed (Postpartum)  Signs and symptoms of listed potential problems will be absent, minimized or managed by discharge/transition of care (reference Postpartum (Vaginal Delivery) (Adult,Obstetrics,Pediatric) CPG).   Outcome: Improving  Assessments completed as charted. B/P: 118/63, T: 98.4, P: 79, R: 18. Rates pain: 0/10. Voiding without difficulty. Fundus: midline and firm at the umbilicus. Lochia: Moderate. Activity: unrestricted without pain, and normal activity. Infant feeding: Breastfeeding going well with deep latch and audible swallows. Colostrum visualized with hand expression taught and return demonstrated     Postpartum breastfeeding assessment completed and education provided, see Patient Education Activity.  Items included in the education are:     proper positioning and latch    effectiveness of feeding    manual expression    handling and storing breastmilk    maintenance of breastfeeding for the first 6 months    sign/symptoms of infant feeding issues requiring referral to qualified health care provider  Postpartum care education provided, see Patient Education activity. Patient denies needs. Will monitor.  Dayna Orellana, RN, IBCLC

## 2018-07-14 NOTE — L&D DELIVERY NOTE
Delivery Summary    Romeo Funez is a 22 year old  who was admitted at 40w0d with spontaneous labor. She underwent AROM and continued to progress spontaneously. She delivered a vigorous female infant, GAIL presentation, without complication. Apgars 8/9. Weight 3374g. Placenta delivered spontaneously and appeared intact. Hymenal laceration was bleeding and repaired with running 3-0 vicryl. Perineum intact. .     Dr Franco, pediatrics was called to attend delivery due to meconium fluid and potential need for  resuscitation.    Linh Guadalupe MD  OB/GYN  2018 12:45 AM      Romeo Funez MRN# [id number]   Age: 22 year old YOB: 1995     Labor Event Times:    Labor Onset Date       Labor Onset Time    Dilation Complete Date    Dilation Complete Time       Start Pushing Date        Start Pushing Time            Labor Length:    1st Stage (hrs/min) 17.00 0.00   2nd Stage (hrs/min) 1.00 20.00   3rd Stage (hrs/min) 0.00 8.00       Labor Events:     Labor No   Rupture Date     Rupture Time     Rupture Type Artificial Rupture of Membranes   Fluid Color     Labor Type     Induction    Induction Indication         Augmentation    Labor Complications     Additional Complications     Management of Labor        Antibiotics     IV Antibiotic Given     Additional Management     Fetal Status Prior to  Delivery     Fetal Status Comments         Cervical Ripening:    Date     Time     Type         Delivery:    Episiotomy None   Local Anesthetic        Lacerations None   Sponge Count Correct       Needle Count Correct     Final Count by:    Sutures     Blood loss (ml) 400   Packing Intentionally Left In     Number     Comments           Information for the patient's :  Tyler Funez [[id number]]       Delivery  2018 12:20 AM by  Vaginal, Spontaneous Delivery  Sex:  female Gestational Age: 40w1d  Delivery Clinician:     Living?:            APGARS  One  "minute Five minutes Ten minutes   Skin color:            Heart rate:            Grimace:            Muscle tone:            Breathing:            Totals: 8  9         Presentation/position:           Resuscitation and Interventions: Method:  None  Oxygen Type:     Intubation Time:   # of Attempts:     ETT Size:        Tracheal Suction:     Tracheal returns:      Friendship Care at Delivery:           Cord information:     Disposition of cord blood:      Blood gases sent?    Complications:     Placenta: Delivered:           appearance.  Comments:  .  Disposition: Hospital disposal  Friendship Measurements:  Weight: 7 lb 7 oz (3374 g)  Height: 20.5\"  Head circumference: 31.8 cm  Chest circumference:     Temperature:     Other providers:       Additional  information:  Forceps:    Verbal Informed Consent Obtained:       Alternative Labor Strategies Discussed:     Emergency Resources Available:       Type:       Accrued Pulling Time:       # of Pulls:      Position:     Fetal Station:       Indications:      Other Indications:     Operative Vaginal Delivery Brief Note Forceps:        Vacuum:    Verbal Informed Consent Obtained:     Alternative Labor Strategies Discussed:     Emergency Resources Available:     Type:      Accrued Pulling Time:       # of Pop-Offs:       # of Pulls:       Position:     Fetal Station:      Indications for Vacuum:       Other Indications:    Operative Vaginal Delivery Brief Note Vacuum:        Shoulder Dystocia Shoulder Dystocia    Fetal Tracing Prior to Delivery:  Category 2   Shoulder dystocia present?:  No                                            Breech:       : Type:     Indications for Primary:     Indications for Secondary:     Other Indications:        Observed anomalies     Output in Delivery Room:                              "

## 2018-07-14 NOTE — PROGRESS NOTES
OB/GYN Postpartum Note      S:  Patient is feeling well this morning. Was able to get a little sleep. Breastfeeding going well so far.      O:   Vitals:    18 0153 18 0208 18 0223 18 1015   BP: 149/68 143/74 136/73 118/63   Pulse:       Resp:      Temp:    98.4  F (36.9  C)   TempSrc:    Oral   SpO2:    97%     General: resting in bed, in NAD  Resp: nonlabored    Hemoglobin   Date Value Ref Range Status   2018 11.1 (L) 11.7 - 15.7 g/dL Final   ]    A: Ms. Romeo Funez is a 22 year old  PPD #0 s/p     P:  Elevated BP:   - BPs severe range during pushing efforts but mild to normotensive PP. Will need to continue monitoring closely. Asymptomatic    Heme 11.1 >  > AM Hgb tomorrow  GI: tolerating regular diet  Feeding: breast  Contraception: Mirena  Rubella Immune  Rh positive  Disposition: routine PP cares, recommend staying until PPD#2 for BP monitoring    Linh Guadalupe MD  OB/GYN  2018 11:04 AM

## 2018-07-14 NOTE — PROGRESS NOTES
Epidural completed, left tilt position. No contraction pain at this time. IV LR bolus completed, now IV ar 125 ml/hr. Faria catheter placed per protocol.

## 2018-07-14 NOTE — ANESTHESIA POSTPROCEDURE EVALUATION
Patient: Romeo Funez    * No procedures listed *    Diagnosis:* No pre-op diagnosis entered *  Diagnosis Additional Information: No value filed.    Anesthesia Type:  No value filed.    Note:  Anesthesia Post Evaluation    Patient location during evaluation: Bedside  Patient participation: Able to fully participate in evaluation  Level of consciousness: awake and alert  Pain management: adequate  Airway patency: patent  Cardiovascular status: acceptable, blood pressure returned to baseline and hemodynamically stable  Respiratory status: acceptable  Hydration status: acceptable  PONV: none     Anesthetic complications: None          Last vitals:  Vitals:    07/14/18 0153 07/14/18 0208 07/14/18 0223   BP: 149/68 143/74 136/73   Pulse:      Resp:   16   Temp:      SpO2:            Electronically Signed By: LORI Alejandro CRNA  July 14, 2018  8:06 AM

## 2018-07-14 NOTE — PROGRESS NOTES
Update from MD: If only minimal cervical change after 6 hours of AROM (1844) then to start pitocin.

## 2018-07-14 NOTE — PROGRESS NOTES
Cervical check done at 2300, patient was complete. Patient had a prolonged deceleration into the 60s. Patient was repositioned onto other side, a bolus of LR was started and high flow oxygen was applied. Baby's heart rate recovered within a few minutes. Patient then began to push at 2315. Dr. Guadalupe and Dr. Franco were called for delivery. Analy Oconnor RN on 7/14/2018 at 5:28 AM

## 2018-07-14 NOTE — PROGRESS NOTES
Single viable baby girl born via spontaneous vaginal delivery. Born on 7/14/2019 @ 0020 delivered by Dr. Linh Guadalupe.  Placenta delivered and Pitocin administered per order. 2nd tear with repair.  Initial post-partum vitals stable. Will continue to monitor and provide interventions as needed.

## 2018-07-15 LAB — HGB BLD-MCNC: 11.1 G/DL (ref 11.7–15.7)

## 2018-07-15 PROCEDURE — 25000132 ZZH RX MED GY IP 250 OP 250 PS 637: Performed by: OBSTETRICS & GYNECOLOGY

## 2018-07-15 PROCEDURE — 12000027 ZZH R&B OB

## 2018-07-15 PROCEDURE — 99207 ZZC NO CHARGE LOS: CPT | Performed by: OBSTETRICS & GYNECOLOGY

## 2018-07-15 PROCEDURE — 85018 HEMOGLOBIN: CPT | Performed by: OBSTETRICS & GYNECOLOGY

## 2018-07-15 PROCEDURE — 36415 COLL VENOUS BLD VENIPUNCTURE: CPT | Performed by: OBSTETRICS & GYNECOLOGY

## 2018-07-15 RX ADMIN — DOCUSATE SODIUM AND SENNOSIDES 1 TABLET: 8.6; 5 TABLET, FILM COATED ORAL at 12:32

## 2018-07-15 RX ADMIN — IBUPROFEN 800 MG: 400 TABLET ORAL at 00:47

## 2018-07-15 RX ADMIN — IBUPROFEN 800 MG: 400 TABLET ORAL at 20:10

## 2018-07-15 RX ADMIN — ACETAMINOPHEN 650 MG: 325 TABLET, FILM COATED ORAL at 12:32

## 2018-07-15 NOTE — PROGRESS NOTES
OB/GYN Postpartum Note      S:  Patient is feeling well this morning, just tired. Was up most of the night.  Lochia light.  Eating and drinking without nausea.  Ambulating without difficulty.  Pain is well controlled. Breastfeeding getting better, although nipples are sore.     O:   Vitals:    18 1636 07/15/18 0151 07/15/18 0819 07/15/18 0820   BP: 123/67 139/88 126/60 126/60   Pulse:       Resp:  16     Temp: 98.5  F (36.9  C) 98.1  F (36.7  C) 98.1  F (36.7  C) 98.1  F (36.7  C)   TempSrc: Oral Oral     SpO2:         General: resting in bed, breastfeeding, in NAD  Resp: nonlabored  Abdomen: soft, nontender, nondistended  Fundus firm at umbilicus  Extremities: no edema in BLE    Hemoglobin   Date Value Ref Range Status   07/15/2018 11.1 (L) 11.7 - 15.7 g/dL Final   ]    A: Ms. Romeo Funez is a 22 year old  PPD #1 s/p     P:  Elevated BP:   - BPs severe range during pushing efforts, normotensive postpartum     Heme 11.1 >  > 11.1  GI: tolerating regular diet  Feeding: breast  Contraception: Mirena  Rubella Immune  Rh positive  Disposition: routine PP cares, recommend staying until PPD#2 for BP monitoring    Linh Guadalupe MD  OB/GYN  7/15/2018 11:38 AM

## 2018-07-15 NOTE — PROGRESS NOTES
Nipples feeling very sore, intact with no blistering. Given Lansinoh cream and educated on sore nipples management. Nipple shield given and assisted mother with nursing with shield in place.

## 2018-07-16 VITALS
OXYGEN SATURATION: 99 % | RESPIRATION RATE: 16 BRPM | DIASTOLIC BLOOD PRESSURE: 65 MMHG | HEART RATE: 79 BPM | TEMPERATURE: 97.6 F | SYSTOLIC BLOOD PRESSURE: 136 MMHG

## 2018-07-16 PROCEDURE — 99207 ZZC NO CHARGE LOS: CPT | Performed by: OBSTETRICS & GYNECOLOGY

## 2018-07-16 PROCEDURE — 25000132 ZZH RX MED GY IP 250 OP 250 PS 637: Performed by: OBSTETRICS & GYNECOLOGY

## 2018-07-16 RX ORDER — BREAST PUMP
1 EACH MISCELLANEOUS PRN
Qty: 1 EACH | Refills: 0 | Status: SHIPPED | OUTPATIENT
Start: 2018-07-16 | End: 2018-09-04

## 2018-07-16 RX ORDER — IBUPROFEN 600 MG/1
600 TABLET, FILM COATED ORAL EVERY 6 HOURS PRN
Qty: 30 TABLET | Refills: 0 | Status: SHIPPED | OUTPATIENT
Start: 2018-07-16 | End: 2018-09-04

## 2018-07-16 RX ORDER — AMOXICILLIN 250 MG
1 CAPSULE ORAL 2 TIMES DAILY
Qty: 30 TABLET | Refills: 0 | Status: SHIPPED | OUTPATIENT
Start: 2018-07-16 | End: 2018-09-04

## 2018-07-16 RX ADMIN — Medication 1 G: at 13:37

## 2018-07-16 RX ADMIN — ACETAMINOPHEN 650 MG: 325 TABLET, FILM COATED ORAL at 07:28

## 2018-07-16 RX ADMIN — ACETAMINOPHEN 650 MG: 325 TABLET, FILM COATED ORAL at 01:18

## 2018-07-16 RX ADMIN — DOCUSATE SODIUM AND SENNOSIDES 2 TABLET: 8.6; 5 TABLET, FILM COATED ORAL at 11:37

## 2018-07-16 NOTE — PROGRESS NOTES
Patient discharged per ambulatory with infant in car seat. Mother verified that her band matches her infant's band by comparing the infant's  MR#.  Discharge instructions given. Encouraged to call for any problems, questions or concerns. RXs with pt.

## 2018-07-16 NOTE — PROGRESS NOTES
[facility]    Post-Partum Progress Note    Assessment & Plan   Assessment:  Post-partum day #2  Normal spontaneous vaginal delivery    Doing well.  No excessive bleeding  Pain well-controlled.  BP normalizing    Plan:  Discharge later today    Regulo Maldonado     Interval History   Doing well.  Pain is well-controlled.  No fevers.  No history of foul-smelling vaginal discharge.  Good appetite.  Denies chest pain, shortness of breath, nausea or vomiting.  Vaginal bleeding is similar to a heavy menstrual flow.  Ambulatory.  Breastfeeding well.    Medications     - MEDICATION INSTRUCTIONS -       NO Rho (D) immune globulin (RhoGam) needed - mother Rh POSITIVE       - MEDICATION INSTRUCTIONS -       oxytocin in 0.9% NaCl         senna-docusate  1 tablet Oral BID    Or     senna-docusate  2 tablet Oral BID     sodium chloride (PF)  3 mL Intracatheter Q8H       Physical Exam   Temp: 97.6  F (36.4  C) Temp src: Oral BP: 135/62   Heart Rate: 82 Resp: 16        There were no vitals filed for this visit.  Vital Signs with Ranges  Temp:  [97.6  F (36.4  C)-98.8  F (37.1  C)] 97.6  F (36.4  C)  Heart Rate:  [77-85] 82  Resp:  [16-18] 16  BP: (126-135)/(60-67) 135/62       Uterine fundus is firm, non-tender and at the level of the umbilicus  Extremities Non-tender    Data   Recent Labs   Lab Test  07/13/18   1747   ABO  B   RH  Pos   AS  Neg     Recent Labs   Lab Test  07/15/18   0531  07/14/18   1018   HGB  11.1*  11.1*     No lab results found.

## 2018-07-16 NOTE — DISCHARGE SUMMARY
[facility]    Discharge Summary  Obstetrics    Date of Admission:  2018  Date of Discharge:  2018  Discharging Provider: Regulo Maldonado    Discharge Diagnoses     Post term pregnancy  Mild hypertension, resolving    History of Present Illness   Romeo Funez is a 22 year old female who presented with normal labor and delivery.    Hospital Course   The patient's hospital course was unremarkable.  She recovered as anticipated and experienced no post-delivery complications.  On discharge, her pain was well controlled. Vaginal bleeding is similar to peak menstrual flow.  Voiding without difficulty.  Ambulating well and tolerating a normal diet.  No fevers.  Breastfeeding well.  Infant is stable.  She was discharged on post-partum day #2.    Post-partum hemoglobin:   Hemoglobin   Date Value Ref Range Status   07/15/2018 11.1 (L) 11.7 - 15.7 g/dL Final       Regulo Maldonado    Discharge Disposition   Discharged to home   Condition at discharge: Good    Primary Care Physician   Linh Guadalupe    Consultations This Hospital Stay   ANESTHESIOLOGY IP CONSULT  HOME CARE POST PARTUM/ IP CONSULT  LACTATION IP CONSULT  ANESTHESIOLOGY IP CONSULT    Discharge Orders     Activity   Review discharge instructions     Reason for your hospital stay   Maternity care     Follow Up and recommended labs and tests   Follow up with Dr. Guadalupe, within 6 weeks. for hospital follow- up.  No follow up labs or test are needed.     Discharge Instructions - Postpartum visit   Schedule postpartum visit with your provider and return to clinic in 6 weeks. Also 2 weeks if a  section was done.     Diet   Resume previous diet       Discharge Medications   Current Discharge Medication List      START taking these medications    Details   ibuprofen (ADVIL/MOTRIN) 600 MG tablet Take 1 tablet (600 mg) by mouth every 6 hours as needed for other (cramping)  Qty: 30 tablet, Refills: 0    Associated  Diagnoses: Normal labor and delivery      Misc. Devices (BREAST PUMP) MISC 1 each as needed  Qty: 1 each, Refills: 0    Associated Diagnoses: Normal labor and delivery      senna-docusate (SENOKOT-S;PERICOLACE) 8.6-50 MG per tablet Take 1 tablet by mouth 2 times daily  Qty: 30 tablet, Refills: 0    Associated Diagnoses: Normal labor and delivery         CONTINUE these medications which have NOT CHANGED    Details   ketoconazole (NIZORAL) 2 % shampoo Apply to the affected area and wash off after 5 minutes.  Qty: 120 mL, Refills: 1    Associated Diagnoses: Tinea versicolor      polyethylene glycol (MIRALAX/GLYCOLAX) powder Take 17 g by mouth      Prenatal Vit-Fe Fumarate-FA ([employer] PRENATAL) 28-0.8 MG TABS Take 1 tablet by mouth daily           Allergies   Allergies   Allergen Reactions     Sulfa Drugs Hives            Sulfasalazine Hives

## 2018-07-16 NOTE — PROGRESS NOTES
Assessments completed as charted. B/P: 136/65, T: 97.6, P: 79, R: 16. Rates pain: 4/10 . Voiding without difficulty. Fundus: Midline U/2. Lochia: Light. Activity: normal activity. Infant feeding: Breast feeding going well.       Postpartum breastfeeding assessment completed and education provided, see Patient Education Activity.  Items included in the education are:     proper positioning and latch    effectiveness of feeding    manual expression    handling and storing breastmilk    maintenance of breastfeeding for the first 6 months    sign/symptoms of infant feeding issues requiring referral to qualified health care provider  Postpartum care education provided, see Patient Education activity. Patient denies needs. Will monitor.  Bell Guadalupe

## 2018-07-16 NOTE — LACTATION NOTE
This note was copied from a baby's chart.  INPATIENT LACTATION CONSULT      Consult with Romeo and marito regarding breastfeeding.  Romeo is using the 24 mm nipple shield for tender nipples and difficulty latching.  Obvious rooting with a strong latch observed this feeding session.  Rhythmic and aggressive suckling also noted.  Instructed Romeo on correct positioning and technique when latching babe on.  Romeo is independent with latching babe onto breast.  Minimal assistance required.  Encouraged Romeo on the importance of frequent feedings throughout the day (at least 8-12 feedings in a 24 hour period) and skin to skin contact.  Romeo demonstrated and states she understands all information given.    Jenifer Chung RN, IBCLC  Lactation Consultant  [facility] and [facility]

## 2018-07-17 LAB — T PALLIDUM AB SER QL: NONREACTIVE

## 2018-09-04 ENCOUNTER — HOSPITAL ENCOUNTER (OUTPATIENT)
Dept: ULTRASOUND IMAGING | Facility: OTHER | Age: 23
Discharge: HOME | End: 2018-09-04
Attending: OBSTETRICS & GYNECOLOGY | Admitting: OBSTETRICS & GYNECOLOGY
Payer: COMMERCIAL

## 2018-09-04 ENCOUNTER — PRENATAL OFFICE VISIT (OUTPATIENT)
Dept: OBGYN | Facility: OTHER | Age: 23
End: 2018-09-04
Attending: OBSTETRICS & GYNECOLOGY
Payer: COMMERCIAL

## 2018-09-04 ENCOUNTER — MYC MEDICAL ADVICE (OUTPATIENT)
Dept: OBGYN | Facility: OTHER | Age: 23
End: 2018-09-04

## 2018-09-04 VITALS
HEIGHT: 65 IN | WEIGHT: 145.06 LBS | BODY MASS INDEX: 24.17 KG/M2 | HEART RATE: 92 BPM | DIASTOLIC BLOOD PRESSURE: 76 MMHG | SYSTOLIC BLOOD PRESSURE: 110 MMHG

## 2018-09-04 DIAGNOSIS — N93.9 ABNORMAL UTERINE BLEEDING (AUB): ICD-10-CM

## 2018-09-04 DIAGNOSIS — Z30.430 ENCOUNTER FOR IUD INSERTION: ICD-10-CM

## 2018-09-04 PROCEDURE — 76856 US EXAM PELVIC COMPLETE: CPT

## 2018-09-04 PROCEDURE — 99207 ZZC POST PARTUM EXAM: CPT | Performed by: OBSTETRICS & GYNECOLOGY

## 2018-09-04 ASSESSMENT — ANXIETY QUESTIONNAIRES
5. BEING SO RESTLESS THAT IT IS HARD TO SIT STILL: NOT AT ALL
1. FEELING NERVOUS, ANXIOUS, OR ON EDGE: NOT AT ALL
7. FEELING AFRAID AS IF SOMETHING AWFUL MIGHT HAPPEN: NOT AT ALL
2. NOT BEING ABLE TO STOP OR CONTROL WORRYING: NOT AT ALL
6. BECOMING EASILY ANNOYED OR IRRITABLE: SEVERAL DAYS
GAD7 TOTAL SCORE: 3
IF YOU CHECKED OFF ANY PROBLEMS ON THIS QUESTIONNAIRE, HOW DIFFICULT HAVE THESE PROBLEMS MADE IT FOR YOU TO DO YOUR WORK, TAKE CARE OF THINGS AT HOME, OR GET ALONG WITH OTHER PEOPLE: NOT DIFFICULT AT ALL
3. WORRYING TOO MUCH ABOUT DIFFERENT THINGS: SEVERAL DAYS

## 2018-09-04 ASSESSMENT — PAIN SCALES - GENERAL: PAINLEVEL: NO PAIN (0)

## 2018-09-04 ASSESSMENT — PATIENT HEALTH QUESTIONNAIRE - PHQ9: 5. POOR APPETITE OR OVEREATING: SEVERAL DAYS

## 2018-09-04 NOTE — NURSING NOTE
"Patient presents today for her 6 week postpartum visit. She would like to have an IUD.  Katya Rangel LPN  9/4/2018  11:12 AM           Chief Complaint   Patient presents with     Postpartum Care       Initial /76 (BP Location: Right arm, Patient Position: Sitting, Cuff Size: Adult Large)  Pulse 92  Ht 1.651 m (5' 5\")  Wt 65.8 kg (145 lb 1 oz)  LMP 10/07/2017  BMI 24.14 kg/m2 Estimated body mass index is 24.14 kg/(m^2) as calculated from the following:    Height as of this encounter: 1.651 m (5' 5\").    Weight as of this encounter: 65.8 kg (145 lb 1 oz).  Medication Reconciliation: complete    Katya Rangel LPN    "

## 2018-09-04 NOTE — PROGRESS NOTES
"6 week Postpartum Visit Note    S:  Ms. Romeo Funez is a 22 year old  here for her 6-week postpartum checkup.   - Had a  on 18.  - Infant gender:  girl, weight 3374g  - Feeding Method:  Pumping and bottle feeding.  Complications reported with feeding:  none, infant thriving .    - Bleeding:  Yes- light, never stopped. No fevers or chills.  - Bowel/Urinary problems:  Yes  - constipation, using dietary changes to help  - Sleep: good  - Mood: good  - Relationship with FOB going well. They moved in together into an apartment in [place].     - Contraception Planned:  IUD Mirena  - She  has not had intercourse since delivery..    - Current tobacco use:  No  - Hx of Abuse:  No  ================================================================  ROS: 10 point ROS neg other than the symptoms noted above in the HPI.     O:  /76 (BP Location: Right arm, Patient Position: Sitting, Cuff Size: Adult Large)  Pulse 92  Ht 1.651 m (5' 5\")  Wt 65.8 kg (145 lb 1 oz)  LMP 10/07/2017  BMI 24.14 kg/m2  Gen: Well-appearing, NAD  Psych:  Appropriate mood and affect  Breast: deferred  Abd:  Benign, Soft, flat, non-tender and No masses, organomegaly  Pelvic: deferred, will do at next visit    A/P:  Ms. Romeo Funez is a 22 year old  here for 6 week postpartum visit after . Doing well.  - Persistent bleeding: will get pelvic ultrasound to rule out retained placenta tissue  - Contraception: Mirena. Will wait until after US to ensure no retained products, f/u next week  - Feeding: breast/pumping  - Follow-up: 1 month for IUD check    Linh Guadalupe MD  OB/GYN  2018 11:25 AM      "

## 2018-09-04 NOTE — MR AVS SNAPSHOT
After Visit Summary   9/4/2018    Romeo Funez    MRN: [id number]           Patient Information     Date Of Birth          1995        Visit Information        Provider Department      9/4/2018 11:15 AM Linh Guadalupe MD [facility]        Today's Diagnoses     Abnormal uterine bleeding (AUB)    -  1    Encounter for IUD insertion           Follow-ups after your visit        Your next 10 appointments already scheduled     Sep 04, 2018  1:45 PM CDT   US PELVIC COMPLETE W TRANSVAGINAL with GHUS2   [facility] ([facility])    [address]  [place] [address] [phone]           Please bring a list of your medicines (including vitamins, minerals and over-the-counter drugs). Also, tell your doctor about any allergies you may have. Wear comfortable clothes and leave your valuables at home.  Adults: Drink four 8-ounce glasses of fluid an hour before your exam. If you need to empty your bladder before your exam, try to release only a little urine. Then, drink another glass of fluid.  Children: Children who are potty trained up to 6 years old should drink at least 2 cups (16 oz) of water/non-carbonated beverage 30 minutes prior to the exam. Children who are 6-10 years should drink at least 3 cups (24 oz) of water/non-carbonated beverage 45 minutes prior to the exam. Children who are 10 years or older should drink at least 4 cups (32 oz) of water/non-carbonated beverage 45 minutes prior to the exam. If your child is very uncomfortable or has an urgent need to pee, please notify a technologist; they will try to find out how much longer the wait may be and provide instructions to help relieve the pressure.  Please call the Imaging Department at your exam site with any questions.            Sep 11, 2018  8:15 AM CDT   Post Partum with Linh Guadalupe MD   [facility] ([facility]  "Hospital)    [address]  [place] [id number] [phone]              Future tests that were ordered for you today     Open Future Orders        Priority Expected Expires Ordered    US Pelvic Complete with Transvaginal Routine  9/4/2019 9/4/2018            Who to contact     If you have questions or need follow up information about today's clinic visit or your schedule please contact [facility] directly at [phone].  Normal or non-critical lab and imaging results will be communicated to you by Essential Testinghart, letter or phone within 4 business days after the clinic has received the results. If you do not hear from us within 7 days, please contact the clinic through Gertrude or phone. If you have a critical or abnormal lab result, we will notify you by phone as soon as possible.  Submit refill requests through Gertrude or call your pharmacy and they will forward the refill request to us. Please allow 3 business days for your refill to be completed.          Additional Information About Your Visit        Gertrude Information     Gertrude gives you secure access to your electronic health record. If you see a primary care provider, you can also send messages to your care team and make appointments. If you have questions, please call your primary care clinic.  If you do not have a primary care provider, please call [phone] and they will assist you.        Care EveryWhere ID     This is your Care EveryWhere ID. This could be used by other organizations to access your [facility] medical records  [id number]        Your Vitals Were     Pulse Height Last Period BMI (Body Mass Index)          92 1.651 m (5' 5\") 10/07/2017 24.14 kg/m2         Blood Pressure from Last 3 Encounters:   09/04/18 110/76   07/16/18 136/65   07/11/18 130/70    Weight from Last 3 Encounters:   09/04/18 65.8 kg (145 lb 1 oz)   07/11/18 76.6 kg (168 lb 14.4 oz)   07/03/18 75 kg (165 lb 7 oz)               Primary " Care Provider Office Phone # Fax #    Linh Guadalupe [phone] [address] [facility] [address]        Equal Access to Services     KOZBIGNIEW ELIZABETH : Hadii aad ku hadnylabrittney Myaodalys, wazacda castroblessingha, qayovanita kanovada consuelolupillo, aletha jaymein hayaalobito whitejordynwilbert felder. So [facility] [phone].    ATENCIÓN: Si habla español, tiene a rodgers disposición servicios gratuitos de asistencia lingüística. Llame al [phone].    We comply with applicable federal civil rights laws and [place] laws. We do not discriminate on the basis of race, color, national origin, age, disability, sex, sexual orientation, or gender identity.            Thank you!     Thank you for choosing [facility] AND \[facility]\""  for your care. Our goal is always to provide you with excellent care. Hearing back from our patients is one way we can continue to improve our services. Please take a few minutes to complete the written survey that you may receive in the mail after your visit with us. Thank you!             Your Updated Medication List - Protect others around you: Learn how to safely use, store and throw away your medicines at www.disposemymeds.org.      Notice  As of 9/4/2018 11:48 AM    You have not been prescribed any medications.

## 2018-09-06 ASSESSMENT — ANXIETY QUESTIONNAIRES: GAD7 TOTAL SCORE: 3

## 2018-09-11 ENCOUNTER — TELEPHONE (OUTPATIENT)
Dept: OBGYN | Facility: OTHER | Age: 23
End: 2018-09-11

## 2018-09-11 DIAGNOSIS — Z30.011 ENCOUNTER FOR INITIAL PRESCRIPTION OF CONTRACEPTIVE PILLS: Primary | ICD-10-CM

## 2018-09-11 RX ORDER — ACETAMINOPHEN AND CODEINE PHOSPHATE 120; 12 MG/5ML; MG/5ML
0.35 SOLUTION ORAL DAILY
Qty: 84 TABLET | Refills: 3 | Status: SHIPPED | OUTPATIENT
Start: 2018-09-11

## 2018-09-11 NOTE — TELEPHONE ENCOUNTER
Patient had an appointment with Dr. Linh Guadalupe this morning but cancelled. She was supposed to have an IUD inserted and has not rescheduled this appointment. Please ask her what she would like to do as far as contraceptive management.   We can reschedule for an IUD or Dr. Guadalupe is willing to send in pills for her but if she is breastfeeding, she needs to know that she will need to take them the same time everyday (within an hour) or she may become pregnant.  Katya Rangel LPN  9/11/2018  8:39 AM

## 2018-09-11 NOTE — TELEPHONE ENCOUNTER
This RN telephoned pt.  Left message on machine to call back.  Kori Allen RN on 9/11/2018 at 10:23 AM

## 2018-09-11 NOTE — TELEPHONE ENCOUNTER
This RN telephoned pt regarding note below.  Pt reports she would like to take birth control pills and is breastfeeding.  Pt is requesting Dr. RIGO Guadalupe to send in a Rx for OCPs.  Will route to MD for review and consideration.  Kori Allen RN on 9/11/2018 at 9:49 AM

## 2018-09-11 NOTE — TELEPHONE ENCOUNTER
Rx sent for micronor to her pharmacy. It is very important she takes it at the same time every day. When she is done breastfeeding, she can switch to a combined OCP, which has more flexibility in timing.     Linh Guadalupe MD  OB/GYN  9/11/2018 10:16 AM

## 2019-03-18 ENCOUNTER — MYC MEDICAL ADVICE (OUTPATIENT)
Dept: OBGYN | Facility: OTHER | Age: 24
End: 2019-03-18

## 2019-03-18 DIAGNOSIS — Z30.41 ENCOUNTER FOR SURVEILLANCE OF CONTRACEPTIVE PILLS: Primary | ICD-10-CM

## 2019-03-19 RX ORDER — NORGESTIMATE AND ETHINYL ESTRADIOL 0.25-0.035
1 KIT ORAL DAILY
Qty: 84 TABLET | Refills: 3 | Status: SHIPPED | OUTPATIENT
Start: 2019-03-19

## 2019-03-19 NOTE — TELEPHONE ENCOUNTER
She can switch to a combined hormonal contraceptive pill, which is easier to take and more effective. I sent the prescription to the [place] [facility].  Linh Guadalupe MD  OB/GYN  3/19/2019 8:32 AM

## 2019-05-28 ENCOUNTER — MYC REFILL (OUTPATIENT)
Dept: OBGYN | Facility: OTHER | Age: 24
End: 2019-05-28

## 2019-05-28 DIAGNOSIS — Z30.41 ENCOUNTER FOR SURVEILLANCE OF CONTRACEPTIVE PILLS: ICD-10-CM

## 2019-05-29 RX ORDER — NORGESTIMATE AND ETHINYL ESTRADIOL 0.25-0.035
1 KIT ORAL DAILY
Qty: 84 TABLET | Refills: 3 | OUTPATIENT
Start: 2019-05-29

## 2019-05-29 NOTE — TELEPHONE ENCOUNTER
Refill request refused;    norgestimate-ethinyl estradiol (ORTHO-CYCLEN/SPRINTEC) 0.25-35 MG-MCG tablet 84 tablet 3 3/19/2019  --   Sig - Route: Take 1 tablet by mouth daily - Oral   Sent to pharmacy as: norgestimate-ethinyl estradiol (ORTHO-CYCLEN/SPRINTEC) 0.25-35 MG-MCG tablet   Class: E-Prescribe   Order: [id number]   E-Prescribing Status: Receipt confirmed by pharmacy (3/19/2019  8:32 AM CDT)     Unable to complete prescription refill per RN Medication Refill Policy.................... Ankita Snowden ....................  5/29/2019   8:17 AM

## 2019-11-03 ENCOUNTER — HEALTH MAINTENANCE LETTER (OUTPATIENT)
Age: 24
End: 2019-11-03

## 2020-11-16 ENCOUNTER — HEALTH MAINTENANCE LETTER (OUTPATIENT)
Age: 25
End: 2020-11-16

## 2021-09-18 ENCOUNTER — HEALTH MAINTENANCE LETTER (OUTPATIENT)
Age: 26
End: 2021-09-18

## 2022-01-02 ENCOUNTER — HEALTH MAINTENANCE LETTER (OUTPATIENT)
Age: 27
End: 2022-01-02

## 2022-11-19 ENCOUNTER — HEALTH MAINTENANCE LETTER (OUTPATIENT)
Age: 27
End: 2022-11-19

## 2024-01-28 ENCOUNTER — HEALTH MAINTENANCE LETTER (OUTPATIENT)
Age: 29
End: 2024-01-28

## (undated) RX ORDER — SODIUM CHLORIDE, SODIUM LACTATE, POTASSIUM CHLORIDE, CALCIUM CHLORIDE 600; 310; 30; 20 MG/100ML; MG/100ML; MG/100ML; MG/100ML
INJECTION, SOLUTION INTRAVENOUS
Status: DISPENSED
Start: 2018-07-13